# Patient Record
Sex: FEMALE | Race: ASIAN | NOT HISPANIC OR LATINO
[De-identification: names, ages, dates, MRNs, and addresses within clinical notes are randomized per-mention and may not be internally consistent; named-entity substitution may affect disease eponyms.]

---

## 2020-02-03 ENCOUNTER — APPOINTMENT (OUTPATIENT)
Dept: INTERNAL MEDICINE | Facility: CLINIC | Age: 55
End: 2020-02-03

## 2021-04-05 ENCOUNTER — RESULT CHARGE (OUTPATIENT)
Age: 56
End: 2021-04-05

## 2021-04-05 ENCOUNTER — APPOINTMENT (OUTPATIENT)
Dept: OBGYN | Facility: CLINIC | Age: 56
End: 2021-04-05
Payer: COMMERCIAL

## 2021-04-05 VITALS — SYSTOLIC BLOOD PRESSURE: 110 MMHG | DIASTOLIC BLOOD PRESSURE: 80 MMHG

## 2021-04-05 VITALS — HEIGHT: 63 IN | WEIGHT: 142 LBS | BODY MASS INDEX: 25.16 KG/M2

## 2021-04-05 PROCEDURE — 99072 ADDL SUPL MATRL&STAF TM PHE: CPT

## 2021-04-05 PROCEDURE — 99386 PREV VISIT NEW AGE 40-64: CPT

## 2021-04-05 PROCEDURE — 99214 OFFICE O/P EST MOD 30 MIN: CPT | Mod: 25

## 2021-04-09 LAB
APPEARANCE: CLEAR
BACTERIA UR CULT: NORMAL
BACTERIA: NEGATIVE
BILIRUBIN URINE: NEGATIVE
BLOOD URINE: NEGATIVE
COLOR: COLORLESS
CYTOLOGY CVX/VAG DOC THIN PREP: NORMAL
GLUCOSE QUALITATIVE U: NEGATIVE
HPV HIGH+LOW RISK DNA PNL CVX: NOT DETECTED
HYALINE CASTS: 0 /LPF
KETONES URINE: NEGATIVE
LEUKOCYTE ESTERASE URINE: NEGATIVE
MICROSCOPIC-UA: NORMAL
NITRITE URINE: NEGATIVE
PH URINE: 6.5
PROTEIN URINE: NEGATIVE
RED BLOOD CELLS URINE: 0 /HPF
SPECIFIC GRAVITY URINE: 1.01
SQUAMOUS EPITHELIAL CELLS: 0 /HPF
UROBILINOGEN URINE: NORMAL
WHITE BLOOD CELLS URINE: 0 /HPF

## 2021-04-13 LAB
BILIRUB UR QL STRIP: NORMAL
CLARITY UR: CLEAR
COLLECTION METHOD: NORMAL
GLUCOSE UR-MCNC: NORMAL
HCG UR QL: 0.2 EU/DL
HGB UR QL STRIP.AUTO: NORMAL
KETONES UR-MCNC: NORMAL
LEUKOCYTE ESTERASE UR QL STRIP: NORMAL
NITRITE UR QL STRIP: NORMAL
PH UR STRIP: 7
PROT UR STRIP-MCNC: NORMAL
SP GR UR STRIP: 1.02

## 2021-04-29 ENCOUNTER — OUTPATIENT (OUTPATIENT)
Dept: OUTPATIENT SERVICES | Facility: HOSPITAL | Age: 56
LOS: 1 days | Discharge: HOME | End: 2021-04-29

## 2021-04-29 DIAGNOSIS — Z15.89 GENETIC SUSCEPTIBILITY TO OTHER DISEASE: ICD-10-CM

## 2021-04-30 ENCOUNTER — APPOINTMENT (OUTPATIENT)
Dept: HEMATOLOGY ONCOLOGY | Facility: CLINIC | Age: 56
End: 2021-04-30

## 2021-04-30 ENCOUNTER — LABORATORY RESULT (OUTPATIENT)
Age: 56
End: 2021-04-30

## 2021-05-06 ENCOUNTER — APPOINTMENT (OUTPATIENT)
Dept: OBGYN | Facility: CLINIC | Age: 56
End: 2021-05-06
Payer: COMMERCIAL

## 2021-05-06 VITALS
HEIGHT: 63 IN | SYSTOLIC BLOOD PRESSURE: 110 MMHG | WEIGHT: 142 LBS | BODY MASS INDEX: 25.16 KG/M2 | HEART RATE: 70 BPM | DIASTOLIC BLOOD PRESSURE: 62 MMHG

## 2021-05-06 DIAGNOSIS — R35.0 FREQUENCY OF MICTURITION: ICD-10-CM

## 2021-05-06 PROCEDURE — 99072 ADDL SUPL MATRL&STAF TM PHE: CPT

## 2021-05-06 PROCEDURE — 99213 OFFICE O/P EST LOW 20 MIN: CPT

## 2021-05-06 PROCEDURE — 76856 US EXAM PELVIC COMPLETE: CPT

## 2021-05-06 PROCEDURE — 76830 TRANSVAGINAL US NON-OB: CPT

## 2021-05-06 NOTE — HISTORY OF PRESENT ILLNESS
[Patient reported PAP Smear was normal] : Patient reported PAP Smear was normal [LMP unknown] : LMP unknown [unknown] : Patient is unsure of the date of her LMP [TextBox_4] : 55YO PRESENT FOR SONO DUE TO PELVIC PAIN AND FREQUENT URINATION  [TextBox_25] : N/A [PapSmeardate] : 05/2021 [TextBox_37] : N/A [TextBox_43] : N/A [LMPDate] : 01/01/18 [TextBox_6] : 01/01/18 [FreeTextEntry1] : 01/01/18

## 2021-05-06 NOTE — PROCEDURE
[Transvaginal Ultrasound] : transvaginal ultrasound [FreeTextEntry9] : SEE SONO REPORT [de-identified] : TRANSABDOMINAL ULTRASOUND \par  [FreeTextEntry3] : SEE SCANNED

## 2021-05-06 NOTE — DISCUSSION/SUMMARY
[FreeTextEntry1] : 55 Y/O PRESENTS FOR A PELVIC SONO\par PELVIC SONO PERFORMED- SEE REPORT\par UNREMARKABLE\par PT ONLY HAS FREQUENCY WITH URINATION WHEN SHE DRINKS A LOT- NO ISSUES WITH QUALITY OF LIFE

## 2021-05-10 ENCOUNTER — NON-APPOINTMENT (OUTPATIENT)
Age: 56
End: 2021-05-10

## 2021-11-05 ENCOUNTER — APPOINTMENT (OUTPATIENT)
Dept: INTERNAL MEDICINE | Facility: CLINIC | Age: 56
End: 2021-11-05
Payer: COMMERCIAL

## 2021-11-05 VITALS
BODY MASS INDEX: 24.8 KG/M2 | HEIGHT: 63 IN | OXYGEN SATURATION: 98 % | TEMPERATURE: 97.6 F | HEART RATE: 73 BPM | SYSTOLIC BLOOD PRESSURE: 100 MMHG | WEIGHT: 140 LBS | DIASTOLIC BLOOD PRESSURE: 65 MMHG

## 2021-11-05 DIAGNOSIS — Z82.3 FAMILY HISTORY OF STROKE: ICD-10-CM

## 2021-11-05 DIAGNOSIS — R10.2 PELVIC AND PERINEAL PAIN: ICD-10-CM

## 2021-11-05 DIAGNOSIS — Z23 ENCOUNTER FOR IMMUNIZATION: ICD-10-CM

## 2021-11-05 DIAGNOSIS — Z56.0 UNEMPLOYMENT, UNSPECIFIED: ICD-10-CM

## 2021-11-05 DIAGNOSIS — R92.2 INCONCLUSIVE MAMMOGRAM: ICD-10-CM

## 2021-11-05 DIAGNOSIS — Z85.43 PERSONAL HISTORY OF MALIGNANT NEOPLASM OF OVARY: ICD-10-CM

## 2021-11-05 DIAGNOSIS — Z01.419 ENCOUNTER FOR GYNECOLOGICAL EXAMINATION (GENERAL) (ROUTINE) W/OUT ABNORMAL FINDINGS: ICD-10-CM

## 2021-11-05 DIAGNOSIS — Z82.49 FAMILY HISTORY OF ISCHEMIC HEART DISEASE AND OTHER DISEASES OF THE CIRCULATORY SYSTEM: ICD-10-CM

## 2021-11-05 DIAGNOSIS — R53.83 OTHER FATIGUE: ICD-10-CM

## 2021-11-05 PROCEDURE — 90682 RIV4 VACC RECOMBINANT DNA IM: CPT

## 2021-11-05 PROCEDURE — 93000 ELECTROCARDIOGRAM COMPLETE: CPT

## 2021-11-05 PROCEDURE — 99386 PREV VISIT NEW AGE 40-64: CPT | Mod: 25

## 2021-11-05 PROCEDURE — G0008: CPT

## 2021-11-05 SDOH — ECONOMIC STABILITY - INCOME SECURITY: UNEMPLOYMENT, UNSPECIFIED: Z56.0

## 2021-11-05 NOTE — PHYSICAL EXAM
[No Acute Distress] : no acute distress [Well Nourished] : well nourished [Well Developed] : well developed [Well-Appearing] : well-appearing [Normal Sclera/Conjunctiva] : normal sclera/conjunctiva [PERRL] : pupils equal round and reactive to light [Normal Outer Ear/Nose] : the outer ears and nose were normal in appearance [Normal Oropharynx] : the oropharynx was normal [Normal TMs] : both tympanic membranes were normal [No Lymphadenopathy] : no lymphadenopathy [Supple] : supple [Thyroid Normal, No Nodules] : the thyroid was normal and there were no nodules present [No Respiratory Distress] : no respiratory distress  [No Accessory Muscle Use] : no accessory muscle use [Clear to Auscultation] : lungs were clear to auscultation bilaterally [Normal Rate] : normal rate  [Regular Rhythm] : with a regular rhythm [Normal S1, S2] : normal S1 and S2 [No Murmur] : no murmur heard [No Carotid Bruits] : no carotid bruits [No Edema] : there was no peripheral edema [Soft] : abdomen soft [Non Tender] : non-tender [Non-distended] : non-distended [No Masses] : no abdominal mass palpated [Normal Bowel Sounds] : normal bowel sounds [Normal Supraclavicular Nodes] : no supraclavicular lymphadenopathy [Normal Axillary Nodes] : no axillary lymphadenopathy [Normal Posterior Cervical Nodes] : no posterior cervical lymphadenopathy [Normal Anterior Cervical Nodes] : no anterior cervical lymphadenopathy [Normal Inguinal Nodes] : no inguinal lymphadenopathy [Grossly Normal Strength/Tone] : grossly normal strength/tone [No Focal Deficits] : no focal deficits [Normal Gait] : normal gait [Normal Affect] : the affect was normal [Normal Insight/Judgement] : insight and judgment were intact

## 2021-11-05 NOTE — HEALTH RISK ASSESSMENT
[Patient reported mammogram was normal] : Patient reported mammogram was normal [Patient reported PAP Smear was normal] : Patient reported PAP Smear was normal [Never (0 pts)] : Never (0 points) [No] : In the past 12 months have you used drugs other than those required for medical reasons? No [3] : 2) Feeling down, depressed, or hopeless for nearly every day (3) [HIV test declined] : HIV test declined [Hepatitis C test declined] : Hepatitis C test declined [With Patient/Caregiver] : , with patient/caregiver [Designated Healthcare Proxy] : Designated healthcare proxy [Name: ___] : Health Care Proxy's Name: [unfilled]  [Relationship: ___] : Relationship: [unfilled] [] : No [2] : 1) Little interest or pleasure doing things for more than half of the days (2) [Several Days (1)] : 5.) Poor appetite or overeating? Several days [Nearly Every Day (3)] : 7.) Trouble concentrating on things, such as reading a newspaper or watching television? Nearly every day [Not at All (0)] : 8.) Moving or speaking so slowly that other people could have noticed, or the opposite, moving or speaking faster than usual? Not at all [1/2 of Days or More (2)] : 9.) Thoughts that you would be off dead or of hurting yourself in some way? Half the days or more [Moderate] : severity of depression is moderate [Audit-CScore] : 0 [CFC3Hevzj] : 5 [[id number]] : 22 [Reports changes in hearing] : Reports no changes in hearing [MammogramDate] : 09/21 [PapSmearDate] : 04/21 [ColonoscopyComments] : never had colonoscopy [de-identified] : wears glasses.  seen optom [de-identified] : seen dentist yest.  has dentures [AdvancecareDate] : 11/21

## 2021-11-05 NOTE — PLAN
[FreeTextEntry1] : flublok vac given\par EKG- NSR 69\par pt will f/u for tdap vac, shingrix vac\par rec shingrix vac\par depression/anxiety- pt refused psychotherapy.  pt refused medication today- pt can f/u w me earlier or go to [facility] ER if she chg her mind\par Plan for stress, echo was discussed w her daughter, Dr. Zaid Irizarry

## 2021-11-05 NOTE — REVIEW OF SYSTEMS
[Patient Intake Form Reviewed] : Patient intake form was reviewed [Chest Pain] : chest pain [Heartburn] : heartburn [Negative] : Heme/Lymph [FreeTextEntry7] : no rectal bleeding

## 2021-11-05 NOTE — HISTORY OF PRESENT ILLNESS
[FreeTextEntry1] : CPE [de-identified] : vaccine-last tdap vac more 10 yr.  pt will get COVID booster.  \par diet- healthy diet reviewed w pt\par exercise-no\par GERD- 10 yr.  never had EGD or colonosc.  controlled on PPI.  no abd pain , dysphagia or wgt loss\par chol- compliant w diet, meds.  no myalgia\par depression - for 4-5 years since her children have grown up and moved out.  states she feels better as soon as her children are at home. ?diff in her marriage.   radha Tanner.  Anxiety- KAYLA score17.  has passive suicidal thoughts but no active suicidal thought- states she couldn't leave her children\par clogged ear\par CP - occurs on rest.  intermittent - ? 1mo .  daily - duration 1-2 min. no alleviating or aggrav factors.  no nausea, SOB, diaphoresis.\par walking 2-3 blk- no CP or SOB. \par hx of migraine and when severe- has nausea\par b/l leg pain- only at night, and on sitting- age 43\par fatigue - no sleeping well due to leg pain or stress\par depression/ anxiety- for 4-5 yrs. has passive suicidal thoughts but no active suidical thoughts

## 2021-12-13 ENCOUNTER — APPOINTMENT (OUTPATIENT)
Dept: INTERNAL MEDICINE | Facility: CLINIC | Age: 56
End: 2021-12-13

## 2022-01-04 ENCOUNTER — APPOINTMENT (OUTPATIENT)
Dept: DISASTER EMERGENCY | Facility: CLINIC | Age: 57
End: 2022-01-04

## 2022-01-07 ENCOUNTER — APPOINTMENT (OUTPATIENT)
Dept: GASTROENTEROLOGY | Facility: CLINIC | Age: 57
End: 2022-01-07

## 2022-02-23 ENCOUNTER — RX RENEWAL (OUTPATIENT)
Age: 57
End: 2022-02-23

## 2022-04-12 ENCOUNTER — APPOINTMENT (OUTPATIENT)
Dept: INTERNAL MEDICINE | Facility: CLINIC | Age: 57
End: 2022-04-12

## 2022-04-17 ENCOUNTER — TRANSCRIPTION ENCOUNTER (OUTPATIENT)
Age: 57
End: 2022-04-17

## 2022-04-19 ENCOUNTER — TRANSCRIPTION ENCOUNTER (OUTPATIENT)
Age: 57
End: 2022-04-19

## 2022-04-27 ENCOUNTER — APPOINTMENT (OUTPATIENT)
Dept: INTERNAL MEDICINE | Facility: CLINIC | Age: 57
End: 2022-04-27

## 2022-04-27 ENCOUNTER — TRANSCRIPTION ENCOUNTER (OUTPATIENT)
Age: 57
End: 2022-04-27

## 2022-04-28 ENCOUNTER — OUTPATIENT (OUTPATIENT)
Dept: OUTPATIENT SERVICES | Facility: HOSPITAL | Age: 57
LOS: 1 days | End: 2022-04-28

## 2022-04-28 ENCOUNTER — TRANSCRIPTION ENCOUNTER (OUTPATIENT)
Age: 57
End: 2022-04-28

## 2022-04-28 ENCOUNTER — APPOINTMENT (OUTPATIENT)
Dept: DISASTER EMERGENCY | Facility: HOSPITAL | Age: 57
End: 2022-04-28

## 2022-04-28 VITALS
TEMPERATURE: 99 F | DIASTOLIC BLOOD PRESSURE: 77 MMHG | OXYGEN SATURATION: 97 % | HEART RATE: 80 BPM | SYSTOLIC BLOOD PRESSURE: 110 MMHG | RESPIRATION RATE: 18 BRPM

## 2022-04-28 VITALS
HEIGHT: 63 IN | WEIGHT: 143.08 LBS | OXYGEN SATURATION: 98 % | SYSTOLIC BLOOD PRESSURE: 127 MMHG | DIASTOLIC BLOOD PRESSURE: 84 MMHG | HEART RATE: 87 BPM | TEMPERATURE: 97 F | RESPIRATION RATE: 18 BRPM

## 2022-04-28 DIAGNOSIS — U07.1 COVID-19: ICD-10-CM

## 2022-04-28 RX ORDER — BEBTELOVIMAB 87.5 MG/ML
175 INJECTION, SOLUTION INTRAVENOUS ONCE
Refills: 0 | Status: COMPLETED | OUTPATIENT
Start: 2022-04-28 | End: 2022-04-28

## 2022-04-28 RX ADMIN — BEBTELOVIMAB 175 MILLIGRAM(S): 87.5 INJECTION, SOLUTION INTRAVENOUS at 16:25

## 2022-04-28 NOTE — MONOCLONAL ANTIBODY INFUSION - EXAM
CC: Monoclonal Antibody Infusion/COVID 19 Positive  57yFemale with a PMHx of asthma, HLD testing positive for COVID presenting for MAB    Positive COVID test date: 4/28/22    exam/findings:  T(C): 37.4 (04-28-22 @ 16:31), Max: 37.4 (04-28-22 @ 16:31)  HR: 88 (04-28-22 @ 16:31) (87 - 88)  BP: 118/79 (04-28-22 @ 16:31) (118/79 - 127/84)  RR: 18 (04-28-22 @ 16:31) (18 - 18)  SpO2: --      PE:   Appearance: NAD	  HEENT:   Normal oral mucosa,   Lymphatic: No lymphadenopathy  Cardiovascular: Normal S1 S2, No JVD, No murmurs, No edema  Respiratory: Lungs clear to auscultation	  Gastrointestinal:  Soft, Non-tender, + BS	  Skin: warm and dry  Neurologic: Non-focal  Extremities: Normal range of motion,

## 2022-04-28 NOTE — MONOCLONAL ANTIBODY INFUSION - ASSESSMENT AND PLAN
ASSESSMENT:  Pt is a 57y year old Female COVID positive and symptomatic who was referred for a single injection of Bebtelovimab monoclonal antibody treatment.    Symptoms: cough, HA, fever, sore throat, muscle aches  Risk Profile: Asthma  Vaccination Status: pfizer x2, boosted x1    PLAN:  - Injection procedure explained to patient   - Consent for monoclonal antibody infusion obtained   - Risk & benefits discussed/all questions answered  - Injection of Bebtelovimab  - Will observe patient for one hour post injection and then discharge home with outpatient follow up as planned by PMD.    POST INFUSION ASSESSMENT:   DISCHARGE at approximately  1745  hours    - Patient tolerated injection - well denies complaints of chest pain, SOB, dizziness or palpitations  - VSS for discharge home  - D/C instructions given/ fact sheet included.  - Patient to follow-up with PCP as needed.

## 2022-05-19 ENCOUNTER — APPOINTMENT (OUTPATIENT)
Dept: OBGYN | Facility: CLINIC | Age: 57
End: 2022-05-19

## 2022-07-19 ENCOUNTER — LABORATORY RESULT (OUTPATIENT)
Age: 57
End: 2022-07-19

## 2022-07-20 ENCOUNTER — APPOINTMENT (OUTPATIENT)
Dept: INTERNAL MEDICINE | Facility: CLINIC | Age: 57
End: 2022-07-20

## 2022-07-20 VITALS
DIASTOLIC BLOOD PRESSURE: 70 MMHG | HEART RATE: 80 BPM | TEMPERATURE: 97.3 F | OXYGEN SATURATION: 98 % | SYSTOLIC BLOOD PRESSURE: 110 MMHG | HEIGHT: 63 IN | WEIGHT: 141 LBS | BODY MASS INDEX: 24.98 KG/M2

## 2022-07-20 DIAGNOSIS — Z87.898 PERSONAL HISTORY OF OTHER SPECIFIED CONDITIONS: ICD-10-CM

## 2022-07-20 DIAGNOSIS — Z86.69 PERSONAL HISTORY OF OTHER DISEASES OF THE NERVOUS SYSTEM AND SENSE ORGANS: ICD-10-CM

## 2022-07-20 DIAGNOSIS — Z86.2 PERSONAL HISTORY OF DISEASES OF THE BLOOD AND BLOOD-FORMING ORGANS AND CERTAIN DISORDERS INVOLVING THE IMMUNE MECHANISM: ICD-10-CM

## 2022-07-20 DIAGNOSIS — M25.511 PAIN IN RIGHT SHOULDER: ICD-10-CM

## 2022-07-20 PROCEDURE — 99214 OFFICE O/P EST MOD 30 MIN: CPT

## 2022-07-20 RX ORDER — OMEPRAZOLE 20 MG/1
20 TABLET, DELAYED RELEASE ORAL
Refills: 0 | Status: DISCONTINUED | COMMUNITY
End: 2022-07-20

## 2022-07-20 NOTE — PHYSICAL EXAM
[de-identified] : R shoulder - limted abduction, flexion , int rotation .  pain after 90 deg abduction

## 2022-07-20 NOTE — HISTORY OF PRESENT ILLNESS
[FreeTextEntry1] : CP [de-identified] : pt accompanied by her son, Neel, who also served as historian\par son c/aurelia that pt has freq subconjunctival hemorrhg\par GERD- 10 yr.  never had EGD or colonosc. not fully controlled on PPI.  feels worse if  she doesn't take PPI.  pt drinks 3 cups of tea daily.  using NSAIDs 2 /wk.  eating late\par chol- compliant w diet, meds. \par get leg pain - before bedtime\par depression - for 4-5 years since her children have grown up and moved out.  states she feels better as soon as her children are at home. ?diff in her marriage-  is strict.   w Parkinson.  pt doesn't feel depressed when her sons are with her.  both her sons live w her. pt denies any abuse. \par CP -  now resolved\par reviewed 7/19/22 labs- mild decr vit D\par low vit D- pt states she is taking 5000 units of vit D once a wk.\par walking 2-3 blk- no CP or SOB. occasionally walkiong 10,000 steps\par b/l leg pain- only at night, and on sitting- age 43\par depression/ anxiety- for 4-5 yrs. has passive suicidal thoughts but no active suidical thoughts\par 1-2 yr of R shoulder pain. no trauma.  limited ROM- pain w int rotation, flexion abduction\par

## 2022-07-20 NOTE — PLAN
[FreeTextEntry1] : myalgia- stop statin- 2-3 wk- pt to call me in 2-3 wk\par GERD- incr PPI.  decr caffeine, fatty food.  limit NSAIDs . avoid eating late\par depression- pt doesn' t want to see a therapist

## 2022-08-02 ENCOUNTER — APPOINTMENT (OUTPATIENT)
Dept: OBGYN | Facility: CLINIC | Age: 57
End: 2022-08-02

## 2022-08-02 VITALS
OXYGEN SATURATION: 98 % | SYSTOLIC BLOOD PRESSURE: 122 MMHG | HEIGHT: 63 IN | WEIGHT: 140 LBS | HEART RATE: 73 BPM | DIASTOLIC BLOOD PRESSURE: 81 MMHG | RESPIRATION RATE: 17 BRPM | BODY MASS INDEX: 24.8 KG/M2

## 2022-08-02 PROCEDURE — 99396 PREV VISIT EST AGE 40-64: CPT

## 2022-08-02 NOTE — HISTORY OF PRESENT ILLNESS
[postmenopausal] : postmenopausal [Y] : Positive pregnancy history [Currently In Menopause] : currently in menopause [No] : Patient does not have concerns regarding sex [FreeTextEntry1] : annual gyn exam\par INVITAE- genetic testing- 2021- negative [Mammogramdate] : 2021 [BreastSonogramDate] : 2021 [PapSmeardate] : 4/5/2021 [PGHxTotal] : 4 [[facility]] : 4 [[facility] Instit[facility]exLiving] : 4

## 2022-08-04 LAB — HPV HIGH+LOW RISK DNA PNL CVX: NOT DETECTED

## 2022-08-07 LAB — CYTOLOGY CVX/VAG DOC THIN PREP: NORMAL

## 2022-09-19 ENCOUNTER — APPOINTMENT (OUTPATIENT)
Dept: OBGYN | Facility: CLINIC | Age: 57
End: 2022-09-19

## 2022-09-27 ENCOUNTER — RX RENEWAL (OUTPATIENT)
Age: 57
End: 2022-09-27

## 2022-10-20 ENCOUNTER — APPOINTMENT (OUTPATIENT)
Dept: INTERNAL MEDICINE | Facility: CLINIC | Age: 57
End: 2022-10-20

## 2022-12-01 ENCOUNTER — NON-APPOINTMENT (OUTPATIENT)
Age: 57
End: 2022-12-01

## 2022-12-08 ENCOUNTER — NON-APPOINTMENT (OUTPATIENT)
Age: 57
End: 2022-12-08

## 2022-12-19 ENCOUNTER — APPOINTMENT (OUTPATIENT)
Dept: OBGYN | Facility: CLINIC | Age: 57
End: 2022-12-19

## 2022-12-19 VITALS
HEIGHT: 63 IN | HEART RATE: 76 BPM | WEIGHT: 142 LBS | SYSTOLIC BLOOD PRESSURE: 123 MMHG | DIASTOLIC BLOOD PRESSURE: 85 MMHG | BODY MASS INDEX: 25.16 KG/M2

## 2022-12-19 PROCEDURE — 99213 OFFICE O/P EST LOW 20 MIN: CPT

## 2022-12-19 NOTE — DISCUSSION/SUMMARY
[FreeTextEntry1] : recent mammogram results revealed dense breast tissue\par pt counselled on need for a breast sonogram\par referral given

## 2023-03-17 ENCOUNTER — RX RENEWAL (OUTPATIENT)
Age: 58
End: 2023-03-17

## 2023-04-14 ENCOUNTER — NON-APPOINTMENT (OUTPATIENT)
Age: 58
End: 2023-04-14

## 2023-04-14 ENCOUNTER — APPOINTMENT (OUTPATIENT)
Dept: INTERNAL MEDICINE | Facility: CLINIC | Age: 58
End: 2023-04-14
Payer: COMMERCIAL

## 2023-04-14 VITALS
HEART RATE: 72 BPM | OXYGEN SATURATION: 98 % | DIASTOLIC BLOOD PRESSURE: 80 MMHG | SYSTOLIC BLOOD PRESSURE: 116 MMHG | WEIGHT: 140 LBS | HEIGHT: 63 IN | BODY MASS INDEX: 24.8 KG/M2

## 2023-04-14 DIAGNOSIS — R92.2 INCONCLUSIVE MAMMOGRAM: ICD-10-CM

## 2023-04-14 DIAGNOSIS — F41.8 OTHER SPECIFIED ANXIETY DISORDERS: ICD-10-CM

## 2023-04-14 LAB
25(OH)D3 SERPL-MCNC: 27.4 NG/ML
ALBUMIN SERPL ELPH-MCNC: 4.2 G/DL
ALP BLD-CCNC: 68 U/L
ALT SERPL-CCNC: 18 U/L
ANION GAP SERPL CALC-SCNC: 11 MMOL/L
AST SERPL-CCNC: 26 U/L
BASOPHILS # BLD AUTO: 0.02 K/UL
BASOPHILS NFR BLD AUTO: 0.4 %
BILIRUB SERPL-MCNC: 0.3 MG/DL
BUN SERPL-MCNC: 15 MG/DL
CALCIUM SERPL-MCNC: 10.3 MG/DL
CHLORIDE SERPL-SCNC: 107 MMOL/L
CHOLEST SERPL-MCNC: 178 MG/DL
CO2 SERPL-SCNC: 24 MMOL/L
CREAT SERPL-MCNC: 0.7 MG/DL
EGFR: 99 ML/MIN/1.73M2
EOSINOPHIL # BLD AUTO: 0.14 K/UL
EOSINOPHIL NFR BLD AUTO: 2.6 %
GLUCOSE SERPL-MCNC: 89 MG/DL
HCT VFR BLD CALC: 38.3 %
HDLC SERPL-MCNC: 71 MG/DL
HGB BLD-MCNC: 13 G/DL
IMM GRANULOCYTES NFR BLD AUTO: 0.2 %
LDLC SERPL CALC-MCNC: 91 MG/DL
LDLC SERPL DIRECT ASSAY-MCNC: 99 MG/DL
LYMPHOCYTES # BLD AUTO: 2.45 K/UL
LYMPHOCYTES NFR BLD AUTO: 45.7 %
MAN DIFF?: NORMAL
MCHC RBC-ENTMCNC: 30.2 PG
MCHC RBC-ENTMCNC: 33.9 GM/DL
MCV RBC AUTO: 89.1 FL
MONOCYTES # BLD AUTO: 0.57 K/UL
MONOCYTES NFR BLD AUTO: 10.6 %
NEUTROPHILS # BLD AUTO: 2.17 K/UL
NEUTROPHILS NFR BLD AUTO: 40.5 %
NONHDLC SERPL-MCNC: 107 MG/DL
PLATELET # BLD AUTO: 238 K/UL
POTASSIUM SERPL-SCNC: 4.8 MMOL/L
PROT SERPL-MCNC: 6.8 G/DL
RBC # BLD: 4.3 M/UL
RBC # FLD: 13.8 %
SODIUM SERPL-SCNC: 142 MMOL/L
T4 FREE SERPL-MCNC: 1.1 NG/DL
TRIGL SERPL-MCNC: 80 MG/DL
TSH SERPL-ACNC: 4.33 UIU/ML
WBC # FLD AUTO: 5.36 K/UL

## 2023-04-14 PROCEDURE — 90715 TDAP VACCINE 7 YRS/> IM: CPT

## 2023-04-14 PROCEDURE — 93000 ELECTROCARDIOGRAM COMPLETE: CPT

## 2023-04-14 PROCEDURE — 90471 IMMUNIZATION ADMIN: CPT

## 2023-04-14 PROCEDURE — 99396 PREV VISIT EST AGE 40-64: CPT | Mod: 25

## 2023-04-14 RX ORDER — MELATONIN 3 MG
25 MCG TABLET ORAL
Qty: 90 | Refills: 3 | Status: DISCONTINUED | COMMUNITY
Start: 2022-07-20 | End: 2023-04-14

## 2023-04-14 NOTE — HEALTH RISK ASSESSMENT
[Never (0 pts)] : Never (0 points) [No] : In the past 12 months have you used drugs other than those required for medical reasons? No [Patient reported mammogram was normal] : Patient reported mammogram was normal [Patient reported PAP Smear was normal] : Patient reported PAP Smear was normal [HIV test declined] : HIV test declined [Hepatitis C test declined] : Hepatitis C test declined [With Patient/Caregiver] : , with patient/caregiver [Designated Healthcare Proxy] : Designated healthcare proxy [Name: ___] : Health Care Proxy's Name: [unfilled]  [Relationship: ___] : Relationship: [unfilled] [0] : 1) Little interest or pleasure doing things: Not at all (0) [1] : 2) Feeling down, depressed, or hopeless for several days (1) [PHQ-2 Negative - No further assessment needed] : PHQ-2 Negative - No further assessment needed [Never] : Never [Audit-CScore] : 0 [[id number]] : 1 [Reports changes in hearing] : Reports no changes in hearing [MammogramDate] : 11/22 [PapSmearDate] : 08/22 [ColonoscopyComments] : never had colonoscopy [de-identified] : wears glasses.  seen optom [de-identified] : seen dentist - 4-6 mo [AdvancecareDate] : 04/23

## 2023-04-14 NOTE — PHYSICAL EXAM
[No Acute Distress] : no acute distress [Well Nourished] : well nourished [Well Developed] : well developed [Well-Appearing] : well-appearing [Normal Sclera/Conjunctiva] : normal sclera/conjunctiva [PERRL] : pupils equal round and reactive to light [Normal Outer Ear/Nose] : the outer ears and nose were normal in appearance [Normal Oropharynx] : the oropharynx was normal [Normal TMs] : both tympanic membranes were normal [Supple] : supple [No Lymphadenopathy] : no lymphadenopathy [Thyroid Normal, No Nodules] : the thyroid was normal and there were no nodules present [No Respiratory Distress] : no respiratory distress  [No Accessory Muscle Use] : no accessory muscle use [Clear to Auscultation] : lungs were clear to auscultation bilaterally [Regular Rhythm] : with a regular rhythm [Normal Rate] : normal rate  [Normal S1, S2] : normal S1 and S2 [No Murmur] : no murmur heard [No Carotid Bruits] : no carotid bruits [No Edema] : there was no peripheral edema [Soft] : abdomen soft [Non Tender] : non-tender [Non-distended] : non-distended [No Masses] : no abdominal mass palpated [Normal Bowel Sounds] : normal bowel sounds [Normal Supraclavicular Nodes] : no supraclavicular lymphadenopathy [Normal Axillary Nodes] : no axillary lymphadenopathy [Normal Anterior Cervical Nodes] : no anterior cervical lymphadenopathy [Normal Inguinal Nodes] : no inguinal lymphadenopathy [Grossly Normal Strength/Tone] : grossly normal strength/tone [No Focal Deficits] : no focal deficits [Normal Gait] : normal gait [Normal Affect] : the affect was normal [Normal Insight/Judgement] : insight and judgment were intact [de-identified] : on mass on R side of chest - where pt c/o pain [de-identified] : L shoulder pain on int rotation.  good ROM of b/l shoulders

## 2023-04-14 NOTE — PLAN
[FreeTextEntry1] : GERD- decr caffeine, NSAIDs\par subclin hypothyroid, lipid- f/u 6 mo\par EKG- NSR 65\par tdap vac given.  rec shingrix lidia

## 2023-04-14 NOTE — HISTORY OF PRESENT ILLNESS
[FreeTextEntry1] : CPE [de-identified] : pt accompanied by her son, Keven, who also served as historian\par vaccine- rec tdap , shingrix\par diet- healthy diet\par exercise- 3-4 /wk- cardio, strgth\par reviewed 4/12/23 labs vit D 27, TSH 4.33\par GERD- 10 yr. rec EGD and colonosc.  controlled on PPI.   pt drinks 3 cups of tea daily.  using NSAIDs 2 /wk.  eating late\par chol- compliant w diet, meds. \par get leg pain - before bedtime- states sciatica was r/o. states\par depression -better.  states she feels better as soon as her children are at home. ?diff in her marriage-  is strict.   w Parkinson.  pt doesn't feel depressed when her sons are with her.  both her sons live w her. pt denies any abuse. \par low vit D- pt states she is not taking  of vit D \par walking 2-3 blk- no CP or SOB. occasionally walking 10,000 steps\par 3-4mo of b/l shoulder pain. no trauma.  limited ROM- \par R side CP - 3-4 mo- sev days/ wk- duration a few min- no alleviating or aggrav factors.

## 2023-05-01 ENCOUNTER — TRANSCRIPTION ENCOUNTER (OUTPATIENT)
Age: 58
End: 2023-05-01

## 2023-05-30 ENCOUNTER — APPOINTMENT (OUTPATIENT)
Dept: OPHTHALMOLOGY | Facility: CLINIC | Age: 58
End: 2023-05-30

## 2023-06-06 ENCOUNTER — APPOINTMENT (OUTPATIENT)
Dept: OPHTHALMOLOGY | Facility: CLINIC | Age: 58
End: 2023-06-06

## 2023-07-07 ENCOUNTER — RX RENEWAL (OUTPATIENT)
Age: 58
End: 2023-07-07

## 2023-08-28 ENCOUNTER — APPOINTMENT (OUTPATIENT)
Dept: OPHTHALMOLOGY | Facility: CLINIC | Age: 58
End: 2023-08-28

## 2023-10-20 LAB
ALBUMIN SERPL ELPH-MCNC: 4.3 G/DL
ALP BLD-CCNC: 62 U/L
ALT SERPL-CCNC: 14 U/L
ANION GAP SERPL CALC-SCNC: 9 MMOL/L
AST SERPL-CCNC: 24 U/L
BILIRUB SERPL-MCNC: 0.2 MG/DL
BUN SERPL-MCNC: 13 MG/DL
CALCIUM SERPL-MCNC: 9.9 MG/DL
CHLORIDE SERPL-SCNC: 107 MMOL/L
CHOLEST SERPL-MCNC: 189 MG/DL
CO2 SERPL-SCNC: 25 MMOL/L
CREAT SERPL-MCNC: 0.74 MG/DL
EGFR: 93 ML/MIN/1.73M2
GLUCOSE SERPL-MCNC: 92 MG/DL
HDLC SERPL-MCNC: 73 MG/DL
LDLC SERPL CALC-MCNC: 103 MG/DL
NONHDLC SERPL-MCNC: 116 MG/DL
POTASSIUM SERPL-SCNC: 5 MMOL/L
PROT SERPL-MCNC: 6.8 G/DL
SODIUM SERPL-SCNC: 141 MMOL/L
TRIGL SERPL-MCNC: 68 MG/DL

## 2023-12-20 ENCOUNTER — APPOINTMENT (OUTPATIENT)
Dept: OBGYN | Facility: CLINIC | Age: 58
End: 2023-12-20
Payer: COMMERCIAL

## 2023-12-20 VITALS
BODY MASS INDEX: 24.8 KG/M2 | SYSTOLIC BLOOD PRESSURE: 112 MMHG | WEIGHT: 140 LBS | DIASTOLIC BLOOD PRESSURE: 70 MMHG | HEIGHT: 63 IN

## 2023-12-20 PROCEDURE — 99386 PREV VISIT NEW AGE 40-64: CPT

## 2023-12-20 NOTE — END OF VISIT
[FreeTextEntry3] :    I, Amanda Woods, acted as a scribe on behalf of Dr. Susannah Spears on 12/20/2023.   All medical entries made by the scribe were at my, Dr. Susannah Spears's, direction and personally dictated by me on 12/20/2023. I have reviewed the chart and agree that the record accurately reflects my personal performance of the history, physical exam, assessment, and plan. I have also personally directed, reviewed, and agreed with the chart.

## 2023-12-20 NOTE — HISTORY OF PRESENT ILLNESS
[FreeTextEntry1] : 57 yo presents as new pt for annual exam. She is doing well and has no complaints.   OBH:  x3, C/S x1 GYNH: pap  normal PMH: HLD, GERD, restless leg syndrome PSH: C/S x1 FH: stroke (father), ovarian cancer (second cousin) Allergies: NKDA Medications: omeprazole, atorvastatin, vitamin D Social: none  [Mammogramdate] : 11/22 [PapSmeardate] : 8/22 [TextBox_37] : more than three years ago [TextBox_43] : never

## 2023-12-20 NOTE — PLAN
[FreeTextEntry1] : 59 yo, annual exam  HCM - pap done today - rx dexa - rx breast mammo/sono - referral for colonoscopy given - f/u PCP for annual and appropriate immunizations - rto 1 year

## 2024-01-01 ENCOUNTER — TRANSCRIPTION ENCOUNTER (OUTPATIENT)
Age: 59
End: 2024-01-01

## 2024-01-01 LAB
CYTOLOGY CVX/VAG DOC THIN PREP: ABNORMAL
HPV HIGH+LOW RISK DNA PNL CVX: NOT DETECTED

## 2024-01-04 ENCOUNTER — NON-APPOINTMENT (OUTPATIENT)
Age: 59
End: 2024-01-04

## 2024-01-08 ENCOUNTER — APPOINTMENT (OUTPATIENT)
Dept: INTERNAL MEDICINE | Facility: CLINIC | Age: 59
End: 2024-01-08
Payer: COMMERCIAL

## 2024-01-08 VITALS
SYSTOLIC BLOOD PRESSURE: 100 MMHG | HEART RATE: 71 BPM | OXYGEN SATURATION: 98 % | WEIGHT: 139 LBS | BODY MASS INDEX: 24.63 KG/M2 | HEIGHT: 63 IN | DIASTOLIC BLOOD PRESSURE: 70 MMHG | TEMPERATURE: 97 F

## 2024-01-08 DIAGNOSIS — R07.9 CHEST PAIN, UNSPECIFIED: ICD-10-CM

## 2024-01-08 DIAGNOSIS — R51.9 HEADACHE, UNSPECIFIED: ICD-10-CM

## 2024-01-08 DIAGNOSIS — M79.606 PAIN IN LEG, UNSPECIFIED: ICD-10-CM

## 2024-01-08 PROCEDURE — 99214 OFFICE O/P EST MOD 30 MIN: CPT | Mod: 25

## 2024-01-08 PROCEDURE — G2211 COMPLEX E/M VISIT ADD ON: CPT

## 2024-01-08 NOTE — PHYSICAL EXAM
[de-identified] : no tenderness of temporal art.  [de-identified] : good ROM of shoulder- no pain.  MS 5/5 UExt, LExt.  [de-identified] : CN 2-12 nl.  finger to nose nl.  heel to toe gait nl  [TextEntry] : Constitutional: no acute distress, well nourished, well developed and well-appearing. Pulmonary: no respiratory distress, lungs were clear to auscultation bilaterally, no accessory muscle use. Cardiac: normal rate, with a regular rhythm, normal S1 and S2 and no murmur heard.  Vascular: there was no peripheral edema. Abdomen: abdomen soft, non-tender, non-distended, no abdominal mass palpated and normal bowel sounds. Psychiatric: the affect was normal and insight and judgement were intact

## 2024-01-08 NOTE — HISTORY OF PRESENT ILLNESS
[FreeTextEntry1] : chol [de-identified] : pt accompanied by her son, Keven, who also served as historian vaccine- rec COVID,  shingrix vaccine.  got flu vac 12/28/23 diet- healthy diet exercise- 3-4 /wk- cardio, strgth  reviewed 12/20/23 PAP- atropic vaginitis, reviewed 10/16/23 elev TSH , - has appt today for Mammo reviewed 10/19/23 labs , nl CMP has appt w Ophth GERD- 10 yr. rec EGD and colonosc.  controlled on PPI.   pt drinks 2-3 cups of tea daily.  using NSAIDs 1 /wk for migraine.  eating late- has appt w GI HA- chg- new pounding , unilateral HA- occassional R, occ L side- started 6 mo ago. duration 3-5 hr. assoc w nausea, phonophobia, photophobia. worse w lack of sleep. resolves w rest, advil.   usual HA is diffuse.   no vision chg, motor or sensory deficits, prob w coordination or gait chol- compliant w diet, meds.  get leg pain - before bedtime- resolved stress-?diff in her marriage-  is strict.   w Parkinson.  pt doesn't feel depressed when her sons are with her.  both her sons live w her. pt denies any abuse.  low vit D- pt states she is not taking  of vit D  walking 2-3 blk- no CP or SOB. occasionally walking 10,000 steps  1 yr of b/l shoulder pain. no trauma. good ROM-  now shoulder pain after exercise or house work R side CP - resolved.

## 2024-01-10 LAB
25(OH)D3 SERPL-MCNC: 24.7 NG/ML
ERYTHROCYTE [SEDIMENTATION RATE] IN BLOOD BY WESTERGREN METHOD: 14 MM/HR
T4 FREE SERPL-MCNC: 1.1 NG/DL
TSH SERPL-ACNC: 3.18 UIU/ML

## 2024-01-18 ENCOUNTER — APPOINTMENT (OUTPATIENT)
Dept: OPHTHALMOLOGY | Facility: CLINIC | Age: 59
End: 2024-01-18
Payer: COMMERCIAL

## 2024-01-18 ENCOUNTER — NON-APPOINTMENT (OUTPATIENT)
Age: 59
End: 2024-01-18

## 2024-01-18 PROCEDURE — 92004 COMPRE OPH EXAM NEW PT 1/>: CPT

## 2024-01-26 ENCOUNTER — TRANSCRIPTION ENCOUNTER (OUTPATIENT)
Age: 59
End: 2024-01-26

## 2024-02-09 ENCOUNTER — TRANSCRIPTION ENCOUNTER (OUTPATIENT)
Age: 59
End: 2024-02-09

## 2024-02-10 ENCOUNTER — TRANSCRIPTION ENCOUNTER (OUTPATIENT)
Age: 59
End: 2024-02-10

## 2024-02-13 ENCOUNTER — NON-APPOINTMENT (OUTPATIENT)
Age: 59
End: 2024-02-13

## 2024-02-15 ENCOUNTER — NON-APPOINTMENT (OUTPATIENT)
Age: 59
End: 2024-02-15

## 2024-05-17 ENCOUNTER — NON-APPOINTMENT (OUTPATIENT)
Age: 59
End: 2024-05-17

## 2024-06-28 ENCOUNTER — APPOINTMENT (OUTPATIENT)
Dept: INTERNAL MEDICINE | Facility: CLINIC | Age: 59
End: 2024-06-28
Payer: COMMERCIAL

## 2024-06-28 VITALS
TEMPERATURE: 97.6 F | HEART RATE: 76 BPM | HEIGHT: 63 IN | BODY MASS INDEX: 25.34 KG/M2 | OXYGEN SATURATION: 94 % | SYSTOLIC BLOOD PRESSURE: 118 MMHG | WEIGHT: 143 LBS | DIASTOLIC BLOOD PRESSURE: 74 MMHG

## 2024-06-28 DIAGNOSIS — Z12.11 ENCOUNTER FOR SCREENING FOR MALIGNANT NEOPLASM OF COLON: ICD-10-CM

## 2024-06-28 DIAGNOSIS — E03.8 OTHER SPECIFIED HYPOTHYROIDISM: ICD-10-CM

## 2024-06-28 DIAGNOSIS — E04.9 NONTOXIC GOITER, UNSPECIFIED: ICD-10-CM

## 2024-06-28 DIAGNOSIS — Z01.419 ENCOUNTER FOR GYNECOLOGICAL EXAMINATION (GENERAL) (ROUTINE) W/OUT ABNORMAL FINDINGS: ICD-10-CM

## 2024-06-28 DIAGNOSIS — M25.511 PAIN IN RIGHT SHOULDER: ICD-10-CM

## 2024-06-28 DIAGNOSIS — M25.512 PAIN IN RIGHT SHOULDER: ICD-10-CM

## 2024-06-28 DIAGNOSIS — E78.5 HYPERLIPIDEMIA, UNSPECIFIED: ICD-10-CM

## 2024-06-28 DIAGNOSIS — M25.561 PAIN IN RIGHT KNEE: ICD-10-CM

## 2024-06-28 DIAGNOSIS — Z00.00 ENCOUNTER FOR GENERAL ADULT MEDICAL EXAMINATION W/OUT ABNORMAL FINDINGS: ICD-10-CM

## 2024-06-28 DIAGNOSIS — Z23 ENCOUNTER FOR IMMUNIZATION: ICD-10-CM

## 2024-06-28 DIAGNOSIS — Z12.39 ENCOUNTER FOR OTHER SCREENING FOR MALIGNANT NEOPLASM OF BREAST: ICD-10-CM

## 2024-06-28 DIAGNOSIS — K21.9 GASTRO-ESOPHAGEAL REFLUX DISEASE W/OUT ESOPHAGITIS: ICD-10-CM

## 2024-06-28 DIAGNOSIS — R79.89 OTHER SPECIFIED ABNORMAL FINDINGS OF BLOOD CHEMISTRY: ICD-10-CM

## 2024-06-28 DIAGNOSIS — Z11.51 ENCOUNTER FOR SCREENING FOR HUMAN PAPILLOMAVIRUS (HPV): ICD-10-CM

## 2024-06-28 PROCEDURE — 99396 PREV VISIT EST AGE 40-64: CPT

## 2024-06-28 PROCEDURE — 93000 ELECTROCARDIOGRAM COMPLETE: CPT

## 2024-07-04 ENCOUNTER — NON-APPOINTMENT (OUTPATIENT)
Age: 59
End: 2024-07-04

## 2024-07-10 ENCOUNTER — TRANSCRIPTION ENCOUNTER (OUTPATIENT)
Age: 59
End: 2024-07-10

## 2024-07-15 ENCOUNTER — RESULT REVIEW (OUTPATIENT)
Age: 59
End: 2024-07-15

## 2024-07-15 ENCOUNTER — TRANSCRIPTION ENCOUNTER (OUTPATIENT)
Age: 59
End: 2024-07-15

## 2024-07-15 ENCOUNTER — NON-APPOINTMENT (OUTPATIENT)
Age: 59
End: 2024-07-15

## 2024-07-15 DIAGNOSIS — E04.1 NONTOXIC SINGLE THYROID NODULE: ICD-10-CM

## 2024-07-16 ENCOUNTER — TRANSCRIPTION ENCOUNTER (OUTPATIENT)
Age: 59
End: 2024-07-16

## 2024-07-17 ENCOUNTER — NON-APPOINTMENT (OUTPATIENT)
Age: 59
End: 2024-07-17

## 2024-07-17 ENCOUNTER — TRANSCRIPTION ENCOUNTER (OUTPATIENT)
Age: 59
End: 2024-07-17

## 2024-07-23 ENCOUNTER — RESULT REVIEW (OUTPATIENT)
Age: 59
End: 2024-07-23

## 2024-07-23 ENCOUNTER — APPOINTMENT (OUTPATIENT)
Dept: ULTRASOUND IMAGING | Facility: IMAGING CENTER | Age: 59
End: 2024-07-23
Payer: COMMERCIAL

## 2024-07-23 PROCEDURE — 10005 FNA BX W/US GDN 1ST LES: CPT

## 2024-07-23 PROCEDURE — 88173 CYTOPATH EVAL FNA REPORT: CPT | Mod: 26

## 2024-07-26 ENCOUNTER — APPOINTMENT (OUTPATIENT)
Dept: ENDOCRINOLOGY | Facility: CLINIC | Age: 59
End: 2024-07-26
Payer: COMMERCIAL

## 2024-07-26 VITALS
WEIGHT: 140 LBS | HEART RATE: 90 BPM | RESPIRATION RATE: 17 BRPM | OXYGEN SATURATION: 99 % | DIASTOLIC BLOOD PRESSURE: 70 MMHG | TEMPERATURE: 97.1 F | HEIGHT: 63 IN | BODY MASS INDEX: 24.8 KG/M2 | SYSTOLIC BLOOD PRESSURE: 118 MMHG

## 2024-07-26 DIAGNOSIS — C73 MALIGNANT NEOPLASM OF THYROID GLAND: ICD-10-CM

## 2024-07-26 PROCEDURE — 99204 OFFICE O/P NEW MOD 45 MIN: CPT

## 2024-07-26 NOTE — HISTORY OF PRESENT ILLNESS
[FreeTextEntry1] : 59 year F referred for management of newly diagnosed papillary thyroid ca   Related Thyroid History:  Reported voice changes, had thyroid sonogram which suggested a 3.2 x 2.3 cm isoechoic nodule in the left mid to lower pole, which is s/p FNA 7/23/2024: [facility]  Prior or current medication thyroid use: No Known family or personal hx of thyroid disease: No History of hemithyroidectomy/ thyroidectomy: No Goiter or hx of goiter : No Known Hx of autoimmune disease: No History of Radioactive iodine therapy/ Chest or Neck radiation therapy: No  Reported Symptoms:   Fatigue: yes, believes due to restless legs Weight gain without significant change in appetite: No Cold intolerance: No Depression or memory impairment: No Menstrual irregularities (menorrhagia), infertility: No Weakness, muscle cramps: No Constipation: No Hypersomnolence: No  Hoarseness: yes Dyspnea: No Dysphagia: No

## 2024-07-30 ENCOUNTER — APPOINTMENT (OUTPATIENT)
Dept: SURGERY | Facility: CLINIC | Age: 59
End: 2024-07-30
Payer: COMMERCIAL

## 2024-07-30 VITALS
OXYGEN SATURATION: 100 % | WEIGHT: 140 LBS | BODY MASS INDEX: 24.8 KG/M2 | DIASTOLIC BLOOD PRESSURE: 83 MMHG | SYSTOLIC BLOOD PRESSURE: 122 MMHG | HEART RATE: 69 BPM | HEIGHT: 63 IN

## 2024-07-30 PROCEDURE — 99204 OFFICE O/P NEW MOD 45 MIN: CPT

## 2024-07-30 PROCEDURE — G2211 COMPLEX E/M VISIT ADD ON: CPT

## 2024-08-02 PROBLEM — E04.2 MULTINODULAR GOITER: Status: ACTIVE | Noted: 2024-08-02

## 2024-08-02 PROBLEM — C73 PAPILLARY CARCINOMA OF THYROID: Status: ACTIVE | Noted: 2024-07-30

## 2024-08-03 ENCOUNTER — OUTPATIENT (OUTPATIENT)
Dept: OUTPATIENT SERVICES | Facility: HOSPITAL | Age: 59
LOS: 1 days | End: 2024-08-03
Payer: COMMERCIAL

## 2024-08-03 ENCOUNTER — APPOINTMENT (OUTPATIENT)
Dept: ULTRASOUND IMAGING | Facility: IMAGING CENTER | Age: 59
End: 2024-08-03

## 2024-08-03 DIAGNOSIS — C73 MALIGNANT NEOPLASM OF THYROID GLAND: ICD-10-CM

## 2024-08-03 PROCEDURE — 76536 US EXAM OF HEAD AND NECK: CPT | Mod: 26

## 2024-08-03 PROCEDURE — 76536 US EXAM OF HEAD AND NECK: CPT

## 2024-08-05 ENCOUNTER — LABORATORY RESULT (OUTPATIENT)
Age: 59
End: 2024-08-05

## 2024-08-05 ENCOUNTER — APPOINTMENT (OUTPATIENT)
Dept: SURGERY | Facility: CLINIC | Age: 59
End: 2024-08-05

## 2024-08-05 ENCOUNTER — APPOINTMENT (OUTPATIENT)
Dept: ENDOCRINOLOGY | Facility: CLINIC | Age: 59
End: 2024-08-05

## 2024-08-05 PROCEDURE — G2211 COMPLEX E/M VISIT ADD ON: CPT

## 2024-08-05 PROCEDURE — 99215 OFFICE O/P EST HI 40 MIN: CPT

## 2024-08-05 PROCEDURE — 99417 PROLNG OP E/M EACH 15 MIN: CPT

## 2024-08-05 NOTE — HISTORY OF PRESENT ILLNESS
[de-identified] : Patient referred by Dr. Christianson for evaluation of papillary thyroid cancer. Patient had physical exam which noted thyroid nodule. Thyroid US on 7/5/24 showed right thyroid lobe measured 5 x 1.5 x 1.6 cm, left lobe 6.1 x 3.1 x 2.4 cm. Left lower nodule with 3.5 x 2.7 x 2.2 cm solid T3 nodule. Right lower 7 mm mixed solid nodule noted. FNA done of left lower 3.7 cm nodule positive for papillary thyroid cancer.  Patient denies having any other thyroid related issue in the past. Labs 7/3/24 with TSH normal 2.89. She denies feeling neck mass.  She denies dysphagia, shortness of breath, palpitations, fever. She was having hoarseness when she was sick with URI in May 2024 but this has improved, she saw ENT Dr Gorman with [facility] this week and was told her vocal cords appear normal. I have reviewed all old and new data and available images.  Additional information was obtained from others present at the time of the visit to ensure the completeness of the history.

## 2024-08-05 NOTE — ASSESSMENT
[FreeTextEntry1] : Patient here for evaluation of newly diagnosed papillary thyroid cancer. Recent thyroid US showed 3.7 cm left nodule, FNA positive for malignancy. Reviewed treatment options including left thyroid lobectomy vs total thyroidectomy with central neck dissection. Given size of nodule, advised total thyroidectomy.  Advised patient to obtain thyroid/neck US prior to surgery to evaluate for abnormal lymph nodes and additional nodules in the neck. I have reviewed the pathophysiology of the disease process, the area anatomy and the rationale for surgery.  I discussed the risks, benefits and alternative treatments which include but are not limited to bleeding, infection, numbness, hoarseness, hypocalcemia, scarring, and need for reoperation.  I have answered the patient's questions to their satisfaction.  The patient wishes to proceed with the recommended procedure.  They will contact my office to schedule surgery. S/p: ORIF on L humerus 12/17.

## 2024-08-05 NOTE — CONSULT LETTER
[Dear  ___] : Dear  [unfilled], [Consult Letter:] : I had the pleasure of evaluating your patient, [unfilled]. [Please see my note below.] : Please see my note below. [Consult Closing:] : Thank you very much for allowing me to participate in the care of this patient.  If you have any questions, please do not hesitate to contact me. [Sincerely,] : Sincerely, [FreeTextEntry3] : Ling Menendez MD, FACS Assistant Professor of Surgery and Otolaryngology [facility]

## 2024-08-05 NOTE — PHYSICAL EXAM
[Alert] : alert [Well Nourished] : well nourished [No Acute Distress] : no acute distress [Well Developed] : well developed [Normal Sclera/Conjunctiva] : normal sclera/conjunctiva [EOMI] : extra ocular movement intact [No Proptosis] : no proptosis [Normal Oropharynx] : the oropharynx was normal [No Respiratory Distress] : no respiratory distress [No Accessory Muscle Use] : no accessory muscle use [Clear to Auscultation] : lungs were clear to auscultation bilaterally [Normal S1, S2] : normal S1 and S2 [Normal Rate] : heart rate was normal [Regular Rhythm] : with a regular rhythm [No Edema] : no peripheral edema [Pedal Pulses Normal] : the pedal pulses are present [Normal Bowel Sounds] : normal bowel sounds [Not Tender] : non-tender [Not Distended] : not distended [Soft] : abdomen soft [Normal Anterior Cervical Nodes] : no anterior cervical lymphadenopathy [Normal Posterior Cervical Nodes] : no posterior cervical lymphadenopathy [No Spinal Tenderness] : no spinal tenderness [Spine Straight] : spine straight [No Stigmata of Cushings Syndrome] : no stigmata of Cushings Syndrome [Normal Gait] : normal gait [Normal Strength/Tone] : muscle strength and tone were normal [No Rash] : no rash [Normal Reflexes] : deep tendon reflexes were 2+ and symmetric [No Tremors] : no tremors [Oriented x3] : oriented to person, place, and time [Acanthosis Nigricans] : no acanthosis nigricans [de-identified] : Left lower pole nodule

## 2024-08-05 NOTE — ASSESSMENT
[FreeTextEntry1] : 1. Thyroid Cancer 2. Thyroid Nodules   FNA: 7/23/24 of left lower 3.7 cm nodule positive for papillary thyroid cancer.   PLAN: - Thyroid US 8/3/24 pending final read, images reviewed personally  - Pre-op labs: 7/2024: TSH 2.89, FT4 1.0 - Discussed in detail management of thyroid cancer including surgical resection, possibility of KAUR scanning/therapy, TSH suppression, surveillance after initial management.  - Discussed benefits of hemithyroidectomy vs total thyroidectomy - likely favor total thyroidectomy given large sized malignancy, and pre-op TSH 2.89 will likely result in need for levothyroxine even after hemithyroidectomy. -Discussed risks/possible complications with thyroid surgery including but not limited to scar, hypoparathyroidism and recurrent laryngeal nerve injury. They will discuss further with surgeon.  -Follow up in 4-6 weeks after thyroid surgery for initial staging and risk stratification appointment   RTC in 6 weeks after total thyroidectomy   Discussed case w/ Dr. Shara Charles MD Endocrinology Fellow - PGY-5  I have independently conducted a history and physical examination, and discussed with the fellow.  Adjusted the documentation to represent my own interpretation and assessment of the patient's medical issues.  Mary Beth Olmedo MD [facility] at [address], [address] Ph: [phone] Fax: [phone]  I have spent 91 minutes of time on the encounter. Greater than 50% of the face to face encounter time was spent on counseling and/or coordination of care for management of thyroid cancer. Total time was spent on review of prior records, labs and imaging studies, history and physical examination, documentation of this encounter, placing orders, counseling and coordination of care, all on the date of this visit.

## 2024-08-05 NOTE — CONSULT LETTER
[Dear  ___] : Dear  [unfilled], [Consult Letter:] : I had the pleasure of evaluating your patient, [unfilled]. [Please see my note below.] : Please see my note below. [Consult Closing:] : Thank you very much for allowing me to participate in the care of this patient.  If you have any questions, please do not hesitate to contact me. [Sincerely,] : Sincerely, [FreeTextEntry3] : Ling Menendez MD, FACS Assistant Professor of Surgery and Otolaryngology [facility]

## 2024-08-05 NOTE — HISTORY OF PRESENT ILLNESS
[de-identified] : Patient referred by Dr. Christianson for evaluation of papillary thyroid cancer. Patient had physical exam which noted thyroid nodule. Thyroid US on 7/5/24 showed right thyroid lobe measured 5 x 1.5 x 1.6 cm, left lobe 6.1 x 3.1 x 2.4 cm. Left lower nodule with 3.5 x 2.7 x 2.2 cm solid T3 nodule. Right lower 7 mm mixed solid nodule noted. FNA done of left lower 3.7 cm nodule positive for papillary thyroid cancer.  Patient denies having any other thyroid related issue in the past. Labs 7/3/24 with TSH normal 2.89. She denies feeling neck mass.  She denies dysphagia, shortness of breath, palpitations, fever. She was having hoarseness when she was sick with URI in May 2024 but this has improved, she saw ENT Dr Gorman with [facility] this week and was told her vocal cords appear normal. I have reviewed all old and new data and available images.  Additional information was obtained from others present at the time of the visit to ensure the completeness of the history.

## 2024-08-05 NOTE — REASON FOR VISIT
[Initial Consultation] : an initial consultation for [Other: _____] : [unfilled] [FreeTextEntry2] : papillary thyroid cancer [Source: ______] : History obtained from [unfilled]

## 2024-08-05 NOTE — HISTORY OF PRESENT ILLNESS
[de-identified] : Patient referred by Dr. Christianson for evaluation of papillary thyroid cancer. Patient had physical exam which noted thyroid nodule. Thyroid US on 7/5/24 showed right thyroid lobe measured 5 x 1.5 x 1.6 cm, left lobe 6.1 x 3.1 x 2.4 cm. Left lower nodule with 3.5 x 2.7 x 2.2 cm solid T3 nodule. Right lower 7 mm mixed solid nodule noted. FNA done of left lower 3.7 cm nodule positive for papillary thyroid cancer.  Patient denies having any other thyroid related issue in the past. Labs 7/3/24 with TSH normal 2.89. She denies feeling neck mass.  She denies dysphagia, shortness of breath, palpitations, fever. She was having hoarseness when she was sick with URI in May 2024 but this has improved, she saw ENT Dr Gorman with [facility] this week and was told her vocal cords appear normal. I have reviewed all old and new data and available images.  Additional information was obtained from others present at the time of the visit to ensure the completeness of the history.

## 2024-08-05 NOTE — PHYSICAL EXAM
[de-identified] : Grossly intact [de-identified] : Left lower thyroid nodule 3.5cm, no other palpable mass. Trachea midline. No cervical or supraclavicular adenopathy.  [Normal] : no neck adenopathy [de-identified] : Skin:  normal appearance.  no rash, nodules, vesicles, or erythema, Musculoskeletal:  full range of motion and no deformities appreciated Neurological:  grossly intact Psychiatric:  oriented to person, place and time with appropriate affect Psych: normal and appropriate affect

## 2024-08-05 NOTE — PHYSICAL EXAM
[de-identified] : Grossly intact [de-identified] : Left lower thyroid nodule 3.5cm, no other palpable mass. Trachea midline. No cervical or supraclavicular adenopathy.  [Normal] : no neck adenopathy [de-identified] : Skin:  normal appearance.  no rash, nodules, vesicles, or erythema, Musculoskeletal:  full range of motion and no deformities appreciated Neurological:  grossly intact Psychiatric:  oriented to person, place and time with appropriate affect Psych: normal and appropriate affect

## 2024-08-05 NOTE — PHYSICAL EXAM
[de-identified] : Grossly intact [de-identified] : Left lower thyroid nodule 3.5cm, no other palpable mass. Trachea midline. No cervical or supraclavicular adenopathy.  [Normal] : no neck adenopathy [de-identified] : Skin:  normal appearance.  no rash, nodules, vesicles, or erythema, Musculoskeletal:  full range of motion and no deformities appreciated Neurological:  grossly intact Psychiatric:  oriented to person, place and time with appropriate affect Psych: normal and appropriate affect

## 2024-08-05 NOTE — HISTORY OF PRESENT ILLNESS
[de-identified] : Patient referred by Dr. Christianson for evaluation of papillary thyroid cancer. Patient had physical exam which noted thyroid nodule. Thyroid US on 7/5/24 showed right thyroid lobe measured 5 x 1.5 x 1.6 cm, left lobe 6.1 x 3.1 x 2.4 cm. Left lower nodule with 3.5 x 2.7 x 2.2 cm solid T3 nodule. Right lower 7 mm mixed solid nodule noted. FNA done of left lower 3.7 cm nodule positive for papillary thyroid cancer.  Patient denies having any other thyroid related issue in the past. Labs 7/3/24 with TSH normal 2.89. She denies feeling neck mass.  She denies dysphagia, shortness of breath, palpitations, fever. She was having hoarseness when she was sick with URI in May 2024 but this has improved, she saw ENT Dr Gorman with [facility] this week and was told her vocal cords appear normal. I have reviewed all old and new data and available images.  Additional information was obtained from others present at the time of the visit to ensure the completeness of the history.

## 2024-08-05 NOTE — ASSESSMENT
[FreeTextEntry1] : 1. Thyroid Cancer 2. Thyroid Nodules   FNA: 7/23/24 of left lower 3.7 cm nodule positive for papillary thyroid cancer.   PLAN: - Thyroid US 8/3/24 pending final read, images reviewed personally  - Pre-op labs: 7/2024: TSH 2.89, FT4 1.0 - Discussed in detail management of thyroid cancer including surgical resection, possibility of KAUR scanning/therapy, TSH suppression, surveillance after initial management.  - Discussed benefits of hemithyroidectomy vs total thyroidectomy - likely favor total thyroidectomy given large sized malignancy, and pre-op TSH 2.89 will likely result in need for levothyroxine even after hemithyroidectomy. -Discussed risks/possible complications with thyroid surgery including but not limited to scar, hypoparathyroidism and recurrent laryngeal nerve injury. They will discuss further with surgeon.  -Follow up in 4-6 weeks after thyroid surgery for initial staging and risk stratification appointment   RTC in 6 weeks after total thyroidectomy   Discussed case w/ Dr. Shara Charles MD Endocrinology Fellow - PGY-5  I have independently conducted a history and physical examination, and discussed with the fellow.  Adjusted the documentation to represent my own interpretation and assessment of the patient's medical issues.  Mary Beth Olmedo MD [facility] at [address], [address] Ph: [phone] Fax: [phone]  I have spent 91 minutes of time on the encounter. Greater than 50% of the face to face encounter time was spent on counseling and/or coordination of care for management of thyroid cancer. Total time was spent on review of prior records, labs and imaging studies, history and physical examination, documentation of this encounter, placing orders, counseling and coordination of care, all on the date of this visit.

## 2024-08-05 NOTE — HISTORY OF PRESENT ILLNESS
[FreeTextEntry1] : CHIEF COMPLAINT: Thyroid nodule, thyroid cancer REFERRED BY: Dr. Ling Menendez  PCP Dr. Susannah Magana   Presents with son. Also daughter and additional son on phone call were part of this visit.    HISTORY OF PRESENTING ILLNESS: The patient is a 59 year old female being seen in the office today for evaluation of thyroid cancer.   Initially discovered nodule: Pt reports "lump in throat" in 2023, Patient had physical exam which noted thyroid nodule PCP in first week of July 2024. Thyroid US on 7/5/24 showed LLP 3.5 cm T3 nodule. Right lower 7 mm mixed solid nodule also noted.  FNA: 7/23/24 of left lower 3.7 cm nodule positive for papillary thyroid cancer.   Saw Dr Menendez in 7/30/24, recommended for total thyroidectomy, patient agreeable, pt obtained pre-surgery Thyroid US on 8/3/24 to evaluate for abnormal lymph nodes in the neck, final read pending.  Has sought multiple surgical opinions - Dr. Menendez, Dr. Ross ([place]), Dr. Awilda Osborn ([facility]), has upcoming appt at [facility]. Trying to decide on surgeon.   7/2024: TSH 2.89, FT4 1.0 Reports low energy x 1 year, intermittent. Reports constipation x1 year intermittent., Reports heat sensitivity. Denies weight gain/loss. Reports hoarseness that gets worse dysphagia, hoarseness, neck swelling.    No family history of thyroid cancer or thyroid disease.  No personal history of radiation exposure to the head and neck area.  Social Hx: Originally from [facility] in [place].

## 2024-08-05 NOTE — REVIEW OF SYSTEMS
[Fatigue] : fatigue [Constipation] : constipation [Negative] : Heme/Lymph [Recent Weight Gain (___ Lbs)] : no recent weight gain [Recent Weight Loss (___ Lbs)] : no recent weight loss [Nausea] : no nausea [Vomiting] : no vomiting

## 2024-08-05 NOTE — PHYSICAL EXAM
[de-identified] : Grossly intact [de-identified] : Left lower thyroid nodule 3.5cm, no other palpable mass. Trachea midline. No cervical or supraclavicular adenopathy.  [Normal] : no neck adenopathy [de-identified] : Skin:  normal appearance.  no rash, nodules, vesicles, or erythema, Musculoskeletal:  full range of motion and no deformities appreciated Neurological:  grossly intact Psychiatric:  oriented to person, place and time with appropriate affect Psych: normal and appropriate affect

## 2024-08-05 NOTE — CONSULT LETTER
[Dear  ___] : Dear  [unfilled], [Consult Letter:] : I had the pleasure of evaluating your patient, [unfilled]. [Please see my note below.] : Please see my note below. [Consult Closing:] : Thank you very much for allowing me to participate in the care of this patient.  If you have any questions, please do not hesitate to contact me. [Sincerely,] : Sincerely, [FreeTextEntry3] : Ling Menendez MD, FACS Assistant Professor of Surgery and Otolaryngology [facility]

## 2024-08-05 NOTE — CONSULT LETTER
[Dear  ___] : Dear  [unfilled], [Consult Letter:] : I had the pleasure of evaluating your patient, [unfilled]. [Please see my note below.] : Please see my note below. [Consult Closing:] : Thank you very much for allowing me to participate in the care of this patient.  If you have any questions, please do not hesitate to contact me. [Sincerely,] : Sincerely, [FreeTextEntry3] : Ling Menendez MD, FACS Assistant Professor of Surgery and Otolaryngology [facility]

## 2024-08-05 NOTE — HISTORY OF PRESENT ILLNESS
[FreeTextEntry1] : CHIEF COMPLAINT: Thyroid nodule, thyroid cancer REFERRED BY: Dr. Ling Menendez  PCP Dr. Susannah Magana   Presents with son. Also daughter and additional son on phone call were part of this visit.    HISTORY OF PRESENTING ILLNESS: The patient is a 59 year old female being seen in the office today for evaluation of thyroid cancer.   Initially discovered nodule: Pt reports "lump in throat" in 2023, Patient had physical exam which noted thyroid nodule PCP in first week of July 2024. Thyroid US on 7/5/24 showed LLP 3.5 cm T3 nodule. Right lower 7 mm mixed solid nodule also noted.  FNA: 7/23/24 of left lower 3.7 cm nodule positive for papillary thyroid cancer.   Saw Dr Menendez in 7/30/24, recommended for total thyroidectomy, patient agreeable, pt obtained pre-surgery Thyroid US on 8/3/24 to evaluate for abnormal lymph nodes in the neck, final read pending.  Has sought multiple surgical opinions - Dr. Menendez, Dr. Ross ([place]), Dr. Awilda Osborn ([facility]), has upcoming appt at [facility]. Trying to decide on surgeon.   7/2024: TSH 2.89, FT4 1.0 Reports low energy x 1 year, intermittent. Reports constipation x1 year intermittent., Reports heat sensitivity. Denies weight gain/loss. Reports hoarseness that gets worse dysphagia, hoarseness, neck swelling.    No family history of thyroid cancer or thyroid disease.  No personal history of radiation exposure to the head and neck area.  Social Hx: Originally from [facility] in [place].

## 2024-08-05 NOTE — REASON FOR VISIT
[Initial Evaluation] : an initial evaluation [Thyroid Cancer] : thyroid cancer [Thyroid nodule/ MNG] : thyroid nodule/ MNG

## 2024-08-05 NOTE — ASSESSMENT
[FreeTextEntry1] : Patient here for evaluation of newly diagnosed papillary thyroid cancer. Recent thyroid US showed 3.7 cm left nodule, FNA positive for malignancy. Reviewed treatment options including left thyroid lobectomy vs total thyroidectomy with central neck dissection. Given size of nodule, advised total thyroidectomy.  Advised patient to obtain thyroid/neck US prior to surgery to evaluate for abnormal lymph nodes and additional nodules in the neck. I have reviewed the pathophysiology of the disease process, the area anatomy and the rationale for surgery.  I discussed the risks, benefits and alternative treatments which include but are not limited to bleeding, infection, numbness, hoarseness, hypocalcemia, scarring, and need for reoperation.  I have answered the patient's questions to their satisfaction.  The patient wishes to proceed with the recommended procedure.  They will contact my office to schedule surgery.

## 2024-08-05 NOTE — PHYSICAL EXAM
[Alert] : alert [Well Nourished] : well nourished [No Acute Distress] : no acute distress [Well Developed] : well developed [Normal Sclera/Conjunctiva] : normal sclera/conjunctiva [EOMI] : extra ocular movement intact [No Proptosis] : no proptosis [Normal Oropharynx] : the oropharynx was normal [No Respiratory Distress] : no respiratory distress [No Accessory Muscle Use] : no accessory muscle use [Clear to Auscultation] : lungs were clear to auscultation bilaterally [Normal S1, S2] : normal S1 and S2 [Normal Rate] : heart rate was normal [Regular Rhythm] : with a regular rhythm [No Edema] : no peripheral edema [Pedal Pulses Normal] : the pedal pulses are present [Normal Bowel Sounds] : normal bowel sounds [Not Tender] : non-tender [Not Distended] : not distended [Soft] : abdomen soft [Normal Anterior Cervical Nodes] : no anterior cervical lymphadenopathy [Normal Posterior Cervical Nodes] : no posterior cervical lymphadenopathy [No Spinal Tenderness] : no spinal tenderness [Spine Straight] : spine straight [No Stigmata of Cushings Syndrome] : no stigmata of Cushings Syndrome [Normal Gait] : normal gait [Normal Strength/Tone] : muscle strength and tone were normal [No Rash] : no rash [Normal Reflexes] : deep tendon reflexes were 2+ and symmetric [No Tremors] : no tremors [Oriented x3] : oriented to person, place, and time [Acanthosis Nigricans] : no acanthosis nigricans [de-identified] : Left lower pole nodule

## 2024-08-08 ENCOUNTER — APPOINTMENT (OUTPATIENT)
Dept: SURGERY | Facility: CLINIC | Age: 59
End: 2024-08-08

## 2024-08-08 PROBLEM — E04.1 THYROID NODULE: Noted: 2024-07-15

## 2024-08-08 PROBLEM — E04.9 THYROID ENLARGED: Noted: 2024-06-28

## 2024-08-08 PROBLEM — C73 THYROID CANCER: Noted: 2024-07-25

## 2024-08-08 PROBLEM — E04.2 MULTIPLE THYROID NODULES: Status: ACTIVE | Noted: 2024-08-08

## 2024-08-08 PROBLEM — E03.8 SUBCLINICAL HYPOTHYROIDISM: Noted: 2023-04-14

## 2024-08-08 PROBLEM — C73 PAPILLARY CARCINOMA OF THYROID: Noted: 2024-07-30

## 2024-08-08 PROBLEM — C73 PAPILLARY THYROID CARCINOMA: Status: ACTIVE | Noted: 2024-08-08

## 2024-08-08 PROBLEM — E04.2 MULTINODULAR GOITER: Noted: 2024-08-02

## 2024-08-08 PROCEDURE — 99214 OFFICE O/P EST MOD 30 MIN: CPT

## 2024-08-08 NOTE — REVIEW OF SYSTEMS
ADAN    Preanesthesia Checklist:  patient identified, IV checked, risks and benefits discussed, surgical consent, monitors and equipment checked, pre-op evaluation and timeout performed.    General Procedure Information  Diagnostic Indications for Echo:  CAD / Ischemia  Physician Requesting Echo: Remy Calvert MD    Location performed:  OR  Intubated  Bite block placed   Heart visualized  Probe Insertion: easy  Probe Type:  Biplane  Modalities:  3D, color flow mapping, continuous wave Doppler, pulse wave Doppler and two-dimensional  Probe Placement Observation: easy and atraumatic insertion  ADAN Image stored: images permanently saved  Probe Removal: easy    Echocardiographic and Doppler Measurements    Atria  Right Atrium:  Size normal.  Spontaneous echo contrast not present.  Thrombus not present.  Tumor not present.  Device present.  Left Atrium:  Size normal.  Spontaneous echo contrast not present.  Thrombus not present.  Tumor not present.  Device not present.    Left Atrial Appendage:  Size: Normal. Spontaneous echo contrast in appendage No thrombus. Tumor not presentDevice not present     Atrial Septum  Intra-atrial septal morphology normal.      Pulmonary Venous Flow:  normal    Ventricles  Right Ventricle:  Cavity size normal.Thrombus not present.  Hypertrophy not present.  Global function normal.    Left Ventricle:  Cavity Size normal.  Thrombus not present.  Hypertrophy present.  Global Function normal.      Ventricular Regional Function:   1- Basal Anterior:  normal  2- Basal Anteroseptal:  mildly hypokinetic  3- Basal Inferoseptal:  mildly hypokinetic  4- Basal Inferior:  normal  5- Basal Inferolateral:  normal  6- Basal Anterolateral:  normal  7- Mid Anterior:  normal  8- Mid Anteroseptal:  mildly hypokinetic  9- Mid Inferoseptal:  mildly hypokinetic  10- Mid Inferior:  normal  11- Mid Inferolateral:  normal  12- Mid Anterolateral:  normal  13- Apical Anterior:  severely hypokinetic  14- Apical  Septal:  severely hypokinetic  15- Apical Inferior:  severely hypokinetic  16- Apical Lateral:  severely hypokinetic  17- [place]: akinetic    Other Ventricular Findings: RVFW strain   LV     Ventricular Septum   Intra-ventricular septum morphology normal.      Valves    Aortic Valve:   Annulus normal.    Stenosis none.      Regurgitation none.    Leaflets Morphology normal.    Leaflet motions normal.      Mitral Valve:  Annulus normal.    Stenosis none.    Regurgitation trace.    Morphology Leaflets normal.  Leaflet motions normal.      Tricuspid Valve:   Annulus normal.    Stenosis not present.    Regurgitation trace.    Leaflets morphology normal.    Leaflet motions normal.      Pulmonic Valve:  Annulus normal.    Regurgitation trace.        Aorta  Ascending Aorta:  Size normal.  Dissection not present.  Plaque thickness less than 3mm.  Mobile plaque not present.    Aortic Arch:  Size normal.  Dissection not present.  Plaque thickness less than 3 mm.  Mobile plaque not present.    Descending Aorta:  Size normal.  Dissection not present.  Plaque thickness less than 3 mm.  Mobile plaque not present.      Other Findings  Pericardium:  normal    Pleural Effusion:  none  Pulmonary Arteries:  normal    Post Intervention Findings: Lv ef 59%  Stiff RV with mild dysfunction, mild dilation  No change to valvular assessment  No dissection    Anesthesia Information  Staff  Performed by:  Anesthesiologist  Nathanael Florez DO  Procedure Start Time:  10/4/2022 8:41 AM       [Negative] : Heme/Lymph [As Noted in HPI] : as noted in HPI [Hoarseness] : hoarseness [Heartburn] : heartburn

## 2024-08-08 NOTE — PHYSICAL EXAM
[Midline] : located in midline position [Normal] : orientation to person, place, and time: normal [de-identified] : The neck appears flat.  There is a subtly palpable nodule in the left lower neck with swallowing which is relatively soft.  A limited in-office ultrasound revealed a mostly homogeneous thyroid with several tiny right thyroid cysts, a subcentimeter isoechoic/complex right thyroid nodule, and a large well-circumscribed isoechoic left mid to lower thyroid nodule with a more hypoechoic region.  The inferior aspect of the nodule is able to be appreciated with neck extension and slightly aiming the probe down. [de-identified] : Extremities: MARTIN x 4.   Skin: No obvious skin lesions.   Voice: hoarse

## 2024-08-08 NOTE — REASON FOR VISIT
[Initial Consultation] : an initial consultation for [Other: _____] : [unfilled] [FreeTextEntry2] : a recently diagnosed papillary thyroid carcinoma

## 2024-08-08 NOTE — ASSESSMENT
[FreeTextEntry1] : Assessment:  59-year-old woman presents with bilateral thyroid nodules including a recently diagnosed left-sided papillary thyroid carcinoma.  Plan: - The nature of thyroid nodules and indications for biopsy were first discussed with Ms. Marie and her children and I explained that the only thyroid nodule that met BRIDGET criteria for a biopsy was her recently biopsied left thyroid nodule.   - The management of papillary thyroid carcinoma was then discussed and I explained that non-aggressive thyroid cancers that measure < 4 cm in diameter can be initially managed by either a thyroid lobectomy or total thyroidectomy.  As such, I have recommended at the very least an elective left thyroid lobectomy with left central neck dissection and possible total thyroidectomy if the intraoperative findings are suggestive of an advanced malignancy.  I then explained the possibility of requiring a completion thyroidectomy should the final pathology reveal an advanced malignancy.  Alternatively, I have offered an elective total thyroidectomy with central neck dissection from the start. - The risks of thyroid surgery, including but not limited to, bleeding/hematoma formation, infection, damage to surrounding structures (namely the recurrent laryngeal nerve with resultant transient or permanent hoarseness of voice, the external branch of the superior laryngeal nerve with resultant change in pitch/projection of voice, and the parathyroid glands with resultant transient or permanent hypocalcemia), wound healing issues including internal/external scarring and seroma formation, the need for life-long thyroid hormone replacement, and the possibility of recurrent and/or persistent disease, were then discussed and all questions were answered. - Will follow-up the recent follow-up neck ultrasound and check labs including baseline thyroid antibodies, iPTH, and coags. - The patient and her family expressed understanding of the above and stated that they wanted to think things over regarding the extent of surgery as well as which surgeon and endocrinologist they would like to continue to be involved in her care.  They agreed to contact my office if they would like to schedule surgery.

## 2024-08-08 NOTE — PHYSICAL EXAM
[Midline] : located in midline position [Normal] : orientation to person, place, and time: normal [de-identified] : The neck appears flat.  There is a subtly palpable nodule in the left lower neck with swallowing which is relatively soft.  A limited in-office ultrasound revealed a mostly homogeneous thyroid with several tiny right thyroid cysts, a subcentimeter isoechoic/complex right thyroid nodule, and a large well-circumscribed isoechoic left mid to lower thyroid nodule with a more hypoechoic region.  The inferior aspect of the nodule is able to be appreciated with neck extension and slightly aiming the probe down. [de-identified] : Extremities: MARTIN x 4.   Skin: No obvious skin lesions.   Voice: hoarse

## 2024-08-08 NOTE — HISTORY OF PRESENT ILLNESS
[de-identified] : Ms. Marie is a 59-year-old woman who presents for initial evaluation of a recently diagnosed papillary thyroid carcinoma.  She states that she was diagnosed with a thyroid nodule on physical exam.  She subsequently had a thyroid ultrasound, performed 07/05/2024 ([facility]), which reported a right lower 0.7 cm complex thyroid nodule and a left lower 3.5 cm solid thyroid nodule.  The lateral neck was not reported.  Of note, she recently had a follow-up neck ultrasound at [facility] for which the report is pending.  FNA biopsy of the left thyroid nodule, performed on 07/23/2024 ([facility]) revealed papillary thyroid carcinoma ([place] [facility]).  She denies known family history of thyroid cancer or personal history of dysphagia.  As per her children, her voice has become more hoarse.  She has now been evaluated by several endocrinologists and surgeons and has reportedly had a laryngoscopy which revealed normal vocal cord mobility.   Labs performed 07/02/2024 revealed TSH = 2.00 and serum calcium = 9.7.

## 2024-08-08 NOTE — REVIEW OF SYSTEMS
[Negative] : Heme/Lymph [As Noted in HPI] : as noted in HPI [Hoarseness] : hoarseness [Heartburn] : heartburn

## 2024-08-08 NOTE — HISTORY OF PRESENT ILLNESS
[de-identified] : Ms. Marie is a 59-year-old woman who presents for initial evaluation of a recently diagnosed papillary thyroid carcinoma.  She states that she was diagnosed with a thyroid nodule on physical exam.  She subsequently had a thyroid ultrasound, performed 07/05/2024 ([facility]), which reported a right lower 0.7 cm complex thyroid nodule and a left lower 3.5 cm solid thyroid nodule.  The lateral neck was not reported.  Of note, she recently had a follow-up neck ultrasound at [facility] for which the report is pending.  FNA biopsy of the left thyroid nodule, performed on 07/23/2024 ([facility]) revealed papillary thyroid carcinoma ([place] [facility]).  She denies known family history of thyroid cancer or personal history of dysphagia.  As per her children, her voice has become more hoarse.  She has now been evaluated by several endocrinologists and surgeons and has reportedly had a laryngoscopy which revealed normal vocal cord mobility.   Labs performed 07/02/2024 revealed TSH = 2.00 and serum calcium = 9.7.

## 2024-08-09 ENCOUNTER — TRANSCRIPTION ENCOUNTER (OUTPATIENT)
Age: 59
End: 2024-08-09

## 2024-08-13 ENCOUNTER — NON-APPOINTMENT (OUTPATIENT)
Age: 59
End: 2024-08-13

## 2024-08-14 ENCOUNTER — APPOINTMENT (OUTPATIENT)
Dept: INTERNAL MEDICINE | Facility: CLINIC | Age: 59
End: 2024-08-14
Payer: COMMERCIAL

## 2024-08-14 VITALS
SYSTOLIC BLOOD PRESSURE: 125 MMHG | BODY MASS INDEX: 24.98 KG/M2 | WEIGHT: 141 LBS | DIASTOLIC BLOOD PRESSURE: 81 MMHG | TEMPERATURE: 97.6 F | OXYGEN SATURATION: 100 % | HEART RATE: 64 BPM

## 2024-08-14 DIAGNOSIS — Z01.818 ENCOUNTER FOR OTHER PREPROCEDURAL EXAMINATION: ICD-10-CM

## 2024-08-14 DIAGNOSIS — Z86.69 PERSONAL HISTORY OF OTHER DISEASES OF THE NERVOUS SYSTEM AND SENSE ORGANS: ICD-10-CM

## 2024-08-14 DIAGNOSIS — C73 MALIGNANT NEOPLASM OF THYROID GLAND: ICD-10-CM

## 2024-08-14 DIAGNOSIS — R79.89 OTHER SPECIFIED ABNORMAL FINDINGS OF BLOOD CHEMISTRY: ICD-10-CM

## 2024-08-14 PROCEDURE — 99214 OFFICE O/P EST MOD 30 MIN: CPT

## 2024-08-14 PROCEDURE — 93000 ELECTROCARDIOGRAM COMPLETE: CPT

## 2024-08-14 PROCEDURE — G2211 COMPLEX E/M VISIT ADD ON: CPT

## 2024-08-14 NOTE — PHYSICAL EXAM
[No Acute Distress] : no acute distress [Well Nourished] : well nourished [Well Developed] : well developed [Well-Appearing] : well-appearing [Normal Sclera/Conjunctiva] : normal sclera/conjunctiva [PERRL] : pupils equal round and reactive to light [Normal Outer Ear/Nose] : the outer ears and nose were normal in appearance [Normal Oropharynx] : the oropharynx was normal [No Lymphadenopathy] : no lymphadenopathy [Supple] : supple [Thyroid Normal, No Nodules] : the thyroid was normal and there were no nodules present [No Respiratory Distress] : no respiratory distress  [No Accessory Muscle Use] : no accessory muscle use [Clear to Auscultation] : lungs were clear to auscultation bilaterally [Normal Rate] : normal rate  [Regular Rhythm] : with a regular rhythm [Normal S1, S2] : normal S1 and S2 [No Murmur] : no murmur heard [No Carotid Bruits] : no carotid bruits [No Edema] : there was no peripheral edema [Soft] : abdomen soft [Non Tender] : non-tender [Non-distended] : non-distended [No Masses] : no abdominal mass palpated [Normal Bowel Sounds] : normal bowel sounds [Normal Posterior Cervical Nodes] : no posterior cervical lymphadenopathy [Normal Anterior Cervical Nodes] : no anterior cervical lymphadenopathy [Grossly Normal Strength/Tone] : grossly normal strength/tone [No Focal Deficits] : no focal deficits [Normal Gait] : normal gait [Normal Affect] : the affect was normal [Normal Insight/Judgement] : insight and judgment were intact

## 2024-08-15 LAB
ALBUMIN SERPL ELPH-MCNC: 4.1 G/DL
ALP BLD-CCNC: 66 U/L
ALT SERPL-CCNC: 16 U/L
ANION GAP SERPL CALC-SCNC: 10 MMOL/L
AST SERPL-CCNC: 24 U/L
BASOPHILS # BLD AUTO: 0.02 K/UL
BASOPHILS NFR BLD AUTO: 0.5 %
BILIRUB SERPL-MCNC: 0.3 MG/DL
BUN SERPL-MCNC: 10 MG/DL
CALCIUM SERPL-MCNC: 10.3 MG/DL
CHLORIDE SERPL-SCNC: 109 MMOL/L
CO2 SERPL-SCNC: 24 MMOL/L
CREAT SERPL-MCNC: 0.69 MG/DL
EGFR: 99 ML/MIN/1.73M2
EOSINOPHIL # BLD AUTO: 0.13 K/UL
EOSINOPHIL NFR BLD AUTO: 3.1 %
GLUCOSE SERPL-MCNC: 97 MG/DL
HCT VFR BLD CALC: 37.3 %
HGB BLD-MCNC: 13 G/DL
IMM GRANULOCYTES NFR BLD AUTO: 0 %
LYMPHOCYTES # BLD AUTO: 1.99 K/UL
LYMPHOCYTES NFR BLD AUTO: 47.3 %
MAN DIFF?: NORMAL
MCHC RBC-ENTMCNC: 29.5 PG
MCHC RBC-ENTMCNC: 34.9 GM/DL
MCV RBC AUTO: 84.8 FL
MONOCYTES # BLD AUTO: 0.47 K/UL
MONOCYTES NFR BLD AUTO: 11.2 %
NEUTROPHILS # BLD AUTO: 1.6 K/UL
NEUTROPHILS NFR BLD AUTO: 37.9 %
PLATELET # BLD AUTO: 225 K/UL
POTASSIUM SERPL-SCNC: 4.4 MMOL/L
PROT SERPL-MCNC: 7.1 G/DL
RBC # BLD: 4.4 M/UL
RBC # FLD: 13.8 %
SODIUM SERPL-SCNC: 142 MMOL/L
WBC # FLD AUTO: 4.21 K/UL

## 2024-08-15 NOTE — HISTORY OF PRESENT ILLNESS
[FreeTextEntry1] : thyroidectomy [FreeTextEntry2] : 9/4/24 [FreeTextEntry4] : Use of NSAID/ ASA-no steroid use within the past 6 mo-no hx of bleeding disorders-no hx of blood clots-no hx of anesthesia reaction  Fhx: sudden death-no         anesthesia reaction-no         clotting disorder-no         bleeding disorder-no  walking up 2  flight of stairs- no CP or SOB no snoring or daytime sleepiness  [de-identified] : pt accompanied by her son, Keven, who also served as historian vaccine- rec  shingrix vaccine.   diet- healthy diet exercise- 3-4 /wk- cardio, strgth- GYM  reviewed 12/20/23 PAP- atropic vaginitis,  reviewed  labs 1/8/24 GERD- 10 yr. rec EGD and colonosc.  controlled on PPI but pt gets sympt is she misses dose.   pt drinks 2-3 cups of tea daily.  using NSAIDs 1 /wk for migraine.  eating late- has appt w [facility]- better.  reviewed 2/13/24 MRI brain- sm v ischemic vs. Migraine.  chol- compliant w diet, meds.  stress-?diff in her marriage-  is strict.   w Parkinson.  pt doesn't feel depressed when her sons are with her.  both her sons live w her. pt denies any abuse.  low vit D- pt states she is not taking  of vit D   1 yr of b/l shoulder pain. no trauma. good ROM-  now shoulder pain after exercise or house work R knee pain- for a few mo.  no trauma

## 2024-08-15 NOTE — PLAN
[FreeTextEntry1] : only take PPI on the AM of procedure.  no over the counter meds, NSAID, ASA  for 1 wk prior to procedure non fasting labs- Blood was collected in the office. EKG - NSR 61.  chol- rec pt take atorvastatin 20 mag at night

## 2024-08-15 NOTE — HISTORY OF PRESENT ILLNESS
[FreeTextEntry1] : thyroidectomy [FreeTextEntry2] : 9/4/24 [FreeTextEntry4] : Use of NSAID/ ASA-no steroid use within the past 6 mo-no hx of bleeding disorders-no hx of blood clots-no hx of anesthesia reaction  Fhx: sudden death-no         anesthesia reaction-no         clotting disorder-no         bleeding disorder-no  walking up 2  flight of stairs- no CP or SOB no snoring or daytime sleepiness  [de-identified] : pt accompanied by her son, Keven, who also served as historian vaccine- rec  shingrix vaccine.   diet- healthy diet exercise- 3-4 /wk- cardio, strgth- GYM  reviewed 12/20/23 PAP- atropic vaginitis,  reviewed  labs 1/8/24 GERD- 10 yr. rec EGD and colonosc.  controlled on PPI but pt gets sympt is she misses dose.   pt drinks 2-3 cups of tea daily.  using NSAIDs 1 /wk for migraine.  eating late- has appt w [facility]- better.  reviewed 2/13/24 MRI brain- sm v ischemic vs. Migraine.  chol- compliant w diet, meds.  stress-?diff in her marriage-  is strict.   w Parkinson.  pt doesn't feel depressed when her sons are with her.  both her sons live w her. pt denies any abuse.  low vit D- pt states she is not taking  of vit D   1 yr of b/l shoulder pain. no trauma. good ROM-  now shoulder pain after exercise or house work R knee pain- for a few mo.  no trauma

## 2024-08-15 NOTE — REVIEW OF SYSTEMS
[Fever] : no fever [Chills] : no chills [Earache] : no earache [Sore Throat] : no sore throat [Chest Pain] : no chest pain [Palpitations] : no palpitations [Lower Ext Edema] : no lower extremity edema [Shortness Of Breath] : no shortness of breath [Wheezing] : no wheezing [Cough] : no cough [Dyspnea on Exertion] : no dyspnea on exertion [Abdominal Pain] : no abdominal pain [Nausea] : no nausea [Diarrhea] : diarrhea [Vomiting] : no vomiting [Dysuria] : no dysuria [Dizziness] : no dizziness

## 2024-08-26 ENCOUNTER — NON-APPOINTMENT (OUTPATIENT)
Age: 59
End: 2024-08-26

## 2024-08-28 ENCOUNTER — APPOINTMENT (OUTPATIENT)
Dept: SURGERY | Facility: HOSPITAL | Age: 59
End: 2024-08-28

## 2024-09-26 ENCOUNTER — RX RENEWAL (OUTPATIENT)
Age: 59
End: 2024-09-26

## 2024-10-11 PROBLEM — E78.5 HYPERLIPIDEMIA, UNSPECIFIED: Chronic | Status: ACTIVE | Noted: 2024-08-14

## 2024-10-11 PROBLEM — C73 MALIGNANT NEOPLASM OF THYROID GLAND: Chronic | Status: ACTIVE | Noted: 2024-08-14

## 2024-10-11 PROBLEM — K21.9 GASTRO-ESOPHAGEAL REFLUX DISEASE WITHOUT ESOPHAGITIS: Chronic | Status: ACTIVE | Noted: 2024-08-14

## 2024-10-12 ENCOUNTER — NON-APPOINTMENT (OUTPATIENT)
Age: 59
End: 2024-10-12

## 2024-10-15 ENCOUNTER — APPOINTMENT (OUTPATIENT)
Dept: INTERNAL MEDICINE | Facility: CLINIC | Age: 59
End: 2024-10-15
Payer: COMMERCIAL

## 2024-10-15 VITALS
BODY MASS INDEX: 24.8 KG/M2 | HEART RATE: 75 BPM | HEIGHT: 63 IN | SYSTOLIC BLOOD PRESSURE: 124 MMHG | WEIGHT: 140 LBS | DIASTOLIC BLOOD PRESSURE: 81 MMHG | OXYGEN SATURATION: 98 %

## 2024-10-15 DIAGNOSIS — G24.5 BLEPHAROSPASM: ICD-10-CM

## 2024-10-15 DIAGNOSIS — Z01.818 ENCOUNTER FOR OTHER PREPROCEDURAL EXAMINATION: ICD-10-CM

## 2024-10-15 DIAGNOSIS — C73 MALIGNANT NEOPLASM OF THYROID GLAND: ICD-10-CM

## 2024-10-15 DIAGNOSIS — M79.10 MYALGIA, UNSPECIFIED SITE: ICD-10-CM

## 2024-10-15 DIAGNOSIS — K21.9 GASTRO-ESOPHAGEAL REFLUX DISEASE W/OUT ESOPHAGITIS: ICD-10-CM

## 2024-10-15 DIAGNOSIS — R79.89 OTHER SPECIFIED ABNORMAL FINDINGS OF BLOOD CHEMISTRY: ICD-10-CM

## 2024-10-15 DIAGNOSIS — E78.5 HYPERLIPIDEMIA, UNSPECIFIED: ICD-10-CM

## 2024-10-15 PROCEDURE — 99214 OFFICE O/P EST MOD 30 MIN: CPT

## 2024-10-15 PROCEDURE — G2211 COMPLEX E/M VISIT ADD ON: CPT

## 2024-10-15 RX ORDER — LEVOTHYROXINE SODIUM 0.09 MG/1
88 TABLET ORAL
Refills: 0 | Status: ACTIVE | COMMUNITY

## 2024-10-16 ENCOUNTER — APPOINTMENT (OUTPATIENT)
Dept: ENDOCRINOLOGY | Facility: CLINIC | Age: 59
End: 2024-10-16

## 2024-10-23 LAB
25(OH)D3 SERPL-MCNC: 33.5 NG/ML
ALBUMIN SERPL ELPH-MCNC: 3.9 G/DL
ALP BLD-CCNC: 85 U/L
ALT SERPL-CCNC: 14 U/L
ANION GAP SERPL CALC-SCNC: 10 MMOL/L
AST SERPL-CCNC: 19 U/L
BASOPHILS # BLD AUTO: 0.03 K/UL
BASOPHILS NFR BLD AUTO: 0.6 %
BILIRUB SERPL-MCNC: 0.2 MG/DL
BUN SERPL-MCNC: 13 MG/DL
CALCIUM SERPL-MCNC: 9.5 MG/DL
CHLORIDE SERPL-SCNC: 107 MMOL/L
CK SERPL-CCNC: 102 U/L
CO2 SERPL-SCNC: 23 MMOL/L
CREAT SERPL-MCNC: 0.62 MG/DL
EGFR: 101 ML/MIN/1.73M2
EOSINOPHIL # BLD AUTO: 0.34 K/UL
EOSINOPHIL NFR BLD AUTO: 6.7 %
GLUCOSE SERPL-MCNC: 91 MG/DL
HCT VFR BLD CALC: 36.1 %
HGB BLD-MCNC: 12.1 G/DL
IMM GRANULOCYTES NFR BLD AUTO: 0 %
LYMPHOCYTES # BLD AUTO: 2.68 K/UL
LYMPHOCYTES NFR BLD AUTO: 52.7 %
MAN DIFF?: NORMAL
MCHC RBC-ENTMCNC: 29 PG
MCHC RBC-ENTMCNC: 33.5 GM/DL
MCV RBC AUTO: 86.6 FL
MONOCYTES # BLD AUTO: 0.58 K/UL
MONOCYTES NFR BLD AUTO: 11.4 %
NEUTROPHILS # BLD AUTO: 1.46 K/UL
NEUTROPHILS NFR BLD AUTO: 28.6 %
PLATELET # BLD AUTO: 276 K/UL
POTASSIUM SERPL-SCNC: 4.6 MMOL/L
PROT SERPL-MCNC: 6.4 G/DL
RBC # BLD: 4.17 M/UL
RBC # FLD: 13.9 %
SODIUM SERPL-SCNC: 140 MMOL/L
WBC # FLD AUTO: 5.09 K/UL

## 2024-11-06 ENCOUNTER — APPOINTMENT (OUTPATIENT)
Dept: ENDOCRINOLOGY | Facility: CLINIC | Age: 59
End: 2024-11-06

## 2024-11-07 ENCOUNTER — NON-APPOINTMENT (OUTPATIENT)
Age: 59
End: 2024-11-07

## 2024-11-12 DIAGNOSIS — Z12.31 ENCOUNTER FOR SCREENING MAMMOGRAM FOR MALIGNANT NEOPLASM OF BREAST: ICD-10-CM

## 2025-01-03 ENCOUNTER — RX RENEWAL (OUTPATIENT)
Age: 60
End: 2025-01-03

## 2025-01-07 ENCOUNTER — APPOINTMENT (OUTPATIENT)
Dept: OBGYN | Facility: CLINIC | Age: 60
End: 2025-01-07
Payer: SELF-PAY

## 2025-01-07 VITALS
WEIGHT: 140 LBS | BODY MASS INDEX: 24.8 KG/M2 | SYSTOLIC BLOOD PRESSURE: 130 MMHG | HEIGHT: 63 IN | DIASTOLIC BLOOD PRESSURE: 81 MMHG

## 2025-01-07 DIAGNOSIS — Z01.419 ENCOUNTER FOR GYNECOLOGICAL EXAMINATION (GENERAL) (ROUTINE) W/OUT ABNORMAL FINDINGS: ICD-10-CM

## 2025-01-07 DIAGNOSIS — Z11.51 ENCOUNTER FOR SCREENING FOR HUMAN PAPILLOMAVIRUS (HPV): ICD-10-CM

## 2025-01-07 DIAGNOSIS — Z12.4 ENCOUNTER FOR SCREENING FOR MALIGNANT NEOPLASM OF CERVIX: ICD-10-CM

## 2025-01-07 PROCEDURE — 99396 PREV VISIT EST AGE 40-64: CPT

## 2025-01-09 LAB — HPV HIGH+LOW RISK DNA PNL CVX: NOT DETECTED

## 2025-01-13 ENCOUNTER — TRANSCRIPTION ENCOUNTER (OUTPATIENT)
Age: 60
End: 2025-01-13

## 2025-01-13 PROBLEM — Z01.419 ENCOUNTER FOR WELL WOMAN EXAM WITH ROUTINE GYNECOLOGICAL EXAM: Status: ACTIVE | Noted: 2022-08-02

## 2025-01-13 LAB — CYTOLOGY CVX/VAG DOC THIN PREP: NORMAL

## 2025-01-24 ENCOUNTER — OUTPATIENT (OUTPATIENT)
Dept: OUTPATIENT SERVICES | Facility: HOSPITAL | Age: 60
LOS: 1 days | End: 2025-01-24
Payer: COMMERCIAL

## 2025-01-24 ENCOUNTER — APPOINTMENT (OUTPATIENT)
Dept: RADIOLOGY | Facility: IMAGING CENTER | Age: 60
End: 2025-01-24
Payer: MEDICAID

## 2025-01-24 ENCOUNTER — APPOINTMENT (OUTPATIENT)
Dept: MAMMOGRAPHY | Facility: IMAGING CENTER | Age: 60
End: 2025-01-24
Payer: MEDICAID

## 2025-01-24 ENCOUNTER — RESULT REVIEW (OUTPATIENT)
Age: 60
End: 2025-01-24

## 2025-01-24 DIAGNOSIS — Z12.31 ENCOUNTER FOR SCREENING MAMMOGRAM FOR MALIGNANT NEOPLASM OF BREAST: ICD-10-CM

## 2025-01-24 DIAGNOSIS — Z13.820 ENCOUNTER FOR SCREENING FOR OSTEOPOROSIS: ICD-10-CM

## 2025-01-24 DIAGNOSIS — M79.602 PAIN IN LEFT ARM: ICD-10-CM

## 2025-01-24 DIAGNOSIS — Z98.891 HISTORY OF UTERINE SCAR FROM PREVIOUS SURGERY: Chronic | ICD-10-CM

## 2025-01-24 DIAGNOSIS — Z98.890 OTHER SPECIFIED POSTPROCEDURAL STATES: Chronic | ICD-10-CM

## 2025-01-24 DIAGNOSIS — R92.30 DENSE BREASTS, UNSPECIFIED: ICD-10-CM

## 2025-01-24 PROCEDURE — 77067 SCR MAMMO BI INCL CAD: CPT | Mod: 26

## 2025-01-24 PROCEDURE — 77063 BREAST TOMOSYNTHESIS BI: CPT

## 2025-01-24 PROCEDURE — 77080 DXA BONE DENSITY AXIAL: CPT

## 2025-01-24 PROCEDURE — 77080 DXA BONE DENSITY AXIAL: CPT | Mod: 26

## 2025-01-24 PROCEDURE — 77063 BREAST TOMOSYNTHESIS BI: CPT | Mod: 26

## 2025-01-24 PROCEDURE — 77067 SCR MAMMO BI INCL CAD: CPT

## 2025-01-28 DIAGNOSIS — R92.8 OTHER ABNORMAL AND INCONCLUSIVE FINDINGS ON DIAGNOSTIC IMAGING OF BREAST: ICD-10-CM

## 2025-01-31 ENCOUNTER — APPOINTMENT (OUTPATIENT)
Dept: ULTRASOUND IMAGING | Facility: IMAGING CENTER | Age: 60
End: 2025-01-31
Payer: COMMERCIAL

## 2025-01-31 ENCOUNTER — OUTPATIENT (OUTPATIENT)
Dept: OUTPATIENT SERVICES | Facility: HOSPITAL | Age: 60
LOS: 1 days | End: 2025-01-31
Payer: COMMERCIAL

## 2025-01-31 ENCOUNTER — RESULT REVIEW (OUTPATIENT)
Age: 60
End: 2025-01-31

## 2025-01-31 DIAGNOSIS — Z98.891 HISTORY OF UTERINE SCAR FROM PREVIOUS SURGERY: Chronic | ICD-10-CM

## 2025-01-31 DIAGNOSIS — Z98.890 OTHER SPECIFIED POSTPROCEDURAL STATES: Chronic | ICD-10-CM

## 2025-01-31 DIAGNOSIS — Z12.31 ENCOUNTER FOR SCREENING MAMMOGRAM FOR MALIGNANT NEOPLASM OF BREAST: ICD-10-CM

## 2025-01-31 DIAGNOSIS — R92.30 DENSE BREASTS, UNSPECIFIED: ICD-10-CM

## 2025-01-31 PROCEDURE — 76641 ULTRASOUND BREAST COMPLETE: CPT | Mod: 26,50

## 2025-01-31 PROCEDURE — 76641 ULTRASOUND BREAST COMPLETE: CPT

## 2025-04-02 ENCOUNTER — RX RENEWAL (OUTPATIENT)
Age: 60
End: 2025-04-02

## 2025-04-25 ENCOUNTER — APPOINTMENT (OUTPATIENT)
Dept: ORTHOPEDIC SURGERY | Facility: CLINIC | Age: 60
End: 2025-04-25
Payer: COMMERCIAL

## 2025-04-25 VITALS — BODY MASS INDEX: 24.8 KG/M2 | HEIGHT: 63 IN | WEIGHT: 140 LBS

## 2025-04-25 DIAGNOSIS — M75.52 BURSITIS OF LEFT SHOULDER: ICD-10-CM

## 2025-04-25 DIAGNOSIS — M75.51 BURSITIS OF RIGHT SHOULDER: ICD-10-CM

## 2025-04-25 DIAGNOSIS — M75.42 IMPINGEMENT SYNDROME OF LEFT SHOULDER: ICD-10-CM

## 2025-04-25 DIAGNOSIS — M75.41 IMPINGEMENT SYNDROME OF RIGHT SHOULDER: ICD-10-CM

## 2025-04-25 PROCEDURE — 99204 OFFICE O/P NEW MOD 45 MIN: CPT

## 2025-04-25 RX ORDER — CELECOXIB 100 MG/1
100 CAPSULE ORAL
Qty: 30 | Refills: 0 | Status: ACTIVE | COMMUNITY
Start: 2025-04-25 | End: 1900-01-01

## 2025-04-29 ENCOUNTER — APPOINTMENT (OUTPATIENT)
Dept: INTERNAL MEDICINE | Facility: CLINIC | Age: 60
End: 2025-04-29

## 2025-05-02 ENCOUNTER — APPOINTMENT (OUTPATIENT)
Dept: ENDOCRINOLOGY | Facility: CLINIC | Age: 60
End: 2025-05-02

## 2025-05-09 ENCOUNTER — APPOINTMENT (OUTPATIENT)
Dept: ENDOCRINOLOGY | Facility: CLINIC | Age: 60
End: 2025-05-09
Payer: COMMERCIAL

## 2025-05-09 VITALS
OXYGEN SATURATION: 97 % | BODY MASS INDEX: 24.1 KG/M2 | WEIGHT: 136 LBS | DIASTOLIC BLOOD PRESSURE: 84 MMHG | SYSTOLIC BLOOD PRESSURE: 112 MMHG | HEART RATE: 82 BPM | HEIGHT: 63 IN

## 2025-05-09 DIAGNOSIS — E89.0 POSTPROCEDURAL HYPOTHYROIDISM: ICD-10-CM

## 2025-05-09 DIAGNOSIS — C73 MALIGNANT NEOPLASM OF THYROID GLAND: ICD-10-CM

## 2025-05-09 DIAGNOSIS — E04.2 NONTOXIC MULTINODULAR GOITER: ICD-10-CM

## 2025-05-09 DIAGNOSIS — D44.0 NEOPLASM OF UNCERTAIN BEHAVIOR OF THYROID GLAND: ICD-10-CM

## 2025-05-09 PROCEDURE — 99215 OFFICE O/P EST HI 40 MIN: CPT

## 2025-05-09 PROCEDURE — G2211 COMPLEX E/M VISIT ADD ON: CPT

## 2025-05-10 LAB
ANION GAP SERPL CALC-SCNC: 11 MMOL/L
BUN SERPL-MCNC: 13 MG/DL
CALCIUM SERPL-MCNC: 9.7 MG/DL
CHLORIDE SERPL-SCNC: 107 MMOL/L
CO2 SERPL-SCNC: 22 MMOL/L
CREAT SERPL-MCNC: 0.78 MG/DL
EGFRCR SERPLBLD CKD-EPI 2021: 86 ML/MIN/1.73M2
GLUCOSE SERPL-MCNC: 103 MG/DL
POTASSIUM SERPL-SCNC: 4.9 MMOL/L
SODIUM SERPL-SCNC: 140 MMOL/L
T4 FREE SERPL-MCNC: 1.2 NG/DL
THYROGLOB AB SERPL-ACNC: 17.3 IU/ML
THYROGLOB SERPL-MCNC: <0.1 NG/ML
TSH SERPL-ACNC: 0.91 UIU/ML

## 2025-06-13 ENCOUNTER — APPOINTMENT (OUTPATIENT)
Dept: ORTHOPEDIC SURGERY | Facility: CLINIC | Age: 60
End: 2025-06-13

## 2025-06-20 ENCOUNTER — APPOINTMENT (OUTPATIENT)
Dept: ORTHOPEDIC SURGERY | Facility: CLINIC | Age: 60
End: 2025-06-20
Payer: COMMERCIAL

## 2025-06-20 VITALS — HEIGHT: 63 IN | BODY MASS INDEX: 24.1 KG/M2 | WEIGHT: 136 LBS

## 2025-06-20 PROBLEM — M62.830 LUMBAR PARASPINAL MUSCLE SPASM: Status: ACTIVE | Noted: 2025-06-20

## 2025-06-20 PROBLEM — M54.16 LUMBAR RADICULOPATHY: Status: ACTIVE | Noted: 2025-06-20

## 2025-06-20 PROCEDURE — 72170 X-RAY EXAM OF PELVIS: CPT

## 2025-06-20 PROCEDURE — 72100 X-RAY EXAM L-S SPINE 2/3 VWS: CPT

## 2025-06-20 PROCEDURE — 99213 OFFICE O/P EST LOW 20 MIN: CPT
